# Patient Record
Sex: MALE | Race: WHITE | NOT HISPANIC OR LATINO
[De-identification: names, ages, dates, MRNs, and addresses within clinical notes are randomized per-mention and may not be internally consistent; named-entity substitution may affect disease eponyms.]

---

## 2017-03-01 PROBLEM — Z00.00 ENCOUNTER FOR PREVENTIVE HEALTH EXAMINATION: Status: ACTIVE | Noted: 2017-03-01

## 2017-03-21 ENCOUNTER — RECORD ABSTRACTING (OUTPATIENT)
Age: 77
End: 2017-03-21

## 2017-03-21 DIAGNOSIS — Z87.891 PERSONAL HISTORY OF NICOTINE DEPENDENCE: ICD-10-CM

## 2017-03-21 RX ORDER — ASPIRIN 325 MG/1
TABLET, FILM COATED ORAL
Refills: 0 | Status: ACTIVE | COMMUNITY

## 2017-03-30 ENCOUNTER — APPOINTMENT (OUTPATIENT)
Dept: VASCULAR SURGERY | Facility: CLINIC | Age: 77
End: 2017-03-30

## 2017-03-30 VITALS
SYSTOLIC BLOOD PRESSURE: 124 MMHG | DIASTOLIC BLOOD PRESSURE: 64 MMHG | WEIGHT: 214 LBS | BODY MASS INDEX: 26.61 KG/M2 | HEIGHT: 75 IN

## 2017-03-30 DIAGNOSIS — E78.00 PURE HYPERCHOLESTEROLEMIA, UNSPECIFIED: ICD-10-CM

## 2017-03-30 RX ORDER — ATORVASTATIN CALCIUM 40 MG/1
40 TABLET, FILM COATED ORAL
Refills: 0 | Status: ACTIVE | COMMUNITY

## 2017-09-28 ENCOUNTER — APPOINTMENT (OUTPATIENT)
Dept: VASCULAR SURGERY | Facility: CLINIC | Age: 77
End: 2017-09-28
Payer: MEDICARE

## 2017-09-28 VITALS
WEIGHT: 218 LBS | BODY MASS INDEX: 27.1 KG/M2 | DIASTOLIC BLOOD PRESSURE: 82 MMHG | SYSTOLIC BLOOD PRESSURE: 128 MMHG | HEIGHT: 75 IN

## 2017-09-28 PROCEDURE — 99213 OFFICE O/P EST LOW 20 MIN: CPT

## 2017-09-28 PROCEDURE — 93925 LOWER EXTREMITY STUDY: CPT

## 2018-04-12 ENCOUNTER — APPOINTMENT (OUTPATIENT)
Dept: VASCULAR SURGERY | Facility: CLINIC | Age: 78
End: 2018-04-12
Payer: MEDICARE

## 2018-04-12 VITALS
BODY MASS INDEX: 26.73 KG/M2 | DIASTOLIC BLOOD PRESSURE: 72 MMHG | SYSTOLIC BLOOD PRESSURE: 124 MMHG | WEIGHT: 215 LBS | HEIGHT: 75 IN

## 2018-04-12 PROCEDURE — 99212 OFFICE O/P EST SF 10 MIN: CPT

## 2018-09-20 ENCOUNTER — APPOINTMENT (OUTPATIENT)
Dept: VASCULAR SURGERY | Facility: CLINIC | Age: 78
End: 2018-09-20
Payer: MEDICARE

## 2018-09-20 VITALS
DIASTOLIC BLOOD PRESSURE: 70 MMHG | WEIGHT: 220 LBS | BODY MASS INDEX: 27.35 KG/M2 | SYSTOLIC BLOOD PRESSURE: 120 MMHG | HEIGHT: 75 IN

## 2018-09-20 PROCEDURE — 99214 OFFICE O/P EST MOD 30 MIN: CPT

## 2018-09-24 ENCOUNTER — FORM ENCOUNTER (OUTPATIENT)
Age: 78
End: 2018-09-24

## 2018-09-25 ENCOUNTER — OUTPATIENT (OUTPATIENT)
Dept: OUTPATIENT SERVICES | Facility: HOSPITAL | Age: 78
LOS: 1 days | Discharge: HOME | End: 2018-09-25

## 2018-09-25 VITALS
TEMPERATURE: 98 F | OXYGEN SATURATION: 97 % | RESPIRATION RATE: 18 BRPM | WEIGHT: 229.28 LBS | DIASTOLIC BLOOD PRESSURE: 79 MMHG | SYSTOLIC BLOOD PRESSURE: 169 MMHG | HEART RATE: 77 BPM | HEIGHT: 75 IN

## 2018-09-25 DIAGNOSIS — Z01.818 ENCOUNTER FOR OTHER PREPROCEDURAL EXAMINATION: ICD-10-CM

## 2018-09-25 DIAGNOSIS — I73.9 PERIPHERAL VASCULAR DISEASE, UNSPECIFIED: ICD-10-CM

## 2018-09-25 DIAGNOSIS — Z98.890 OTHER SPECIFIED POSTPROCEDURAL STATES: Chronic | ICD-10-CM

## 2018-09-25 LAB
ALBUMIN SERPL ELPH-MCNC: 4.6 G/DL — SIGNIFICANT CHANGE UP (ref 3.5–5.2)
ALP SERPL-CCNC: 85 U/L — SIGNIFICANT CHANGE UP (ref 30–115)
ALT FLD-CCNC: 21 U/L — SIGNIFICANT CHANGE UP (ref 0–41)
ANION GAP SERPL CALC-SCNC: 15 MMOL/L — HIGH (ref 7–14)
APTT BLD: 34.1 SEC — SIGNIFICANT CHANGE UP (ref 27–39.2)
AST SERPL-CCNC: 23 U/L — SIGNIFICANT CHANGE UP (ref 0–41)
BASOPHILS # BLD AUTO: 0.05 K/UL — SIGNIFICANT CHANGE UP (ref 0–0.2)
BASOPHILS NFR BLD AUTO: 0.5 % — SIGNIFICANT CHANGE UP (ref 0–1)
BILIRUB SERPL-MCNC: 0.4 MG/DL — SIGNIFICANT CHANGE UP (ref 0.2–1.2)
BUN SERPL-MCNC: 21 MG/DL — HIGH (ref 10–20)
CALCIUM SERPL-MCNC: 9.3 MG/DL — SIGNIFICANT CHANGE UP (ref 8.5–10.1)
CHLORIDE SERPL-SCNC: 104 MMOL/L — SIGNIFICANT CHANGE UP (ref 98–110)
CO2 SERPL-SCNC: 27 MMOL/L — SIGNIFICANT CHANGE UP (ref 17–32)
CREAT SERPL-MCNC: 0.9 MG/DL — SIGNIFICANT CHANGE UP (ref 0.7–1.5)
EOSINOPHIL # BLD AUTO: 0.19 K/UL — SIGNIFICANT CHANGE UP (ref 0–0.7)
EOSINOPHIL NFR BLD AUTO: 1.9 % — SIGNIFICANT CHANGE UP (ref 0–8)
GLUCOSE SERPL-MCNC: 93 MG/DL — SIGNIFICANT CHANGE UP (ref 70–99)
HCT VFR BLD CALC: 39.4 % — LOW (ref 42–52)
HGB BLD-MCNC: 13 G/DL — LOW (ref 14–18)
IMM GRANULOCYTES NFR BLD AUTO: 1.1 % — HIGH (ref 0.1–0.3)
INR BLD: 1.01 RATIO — SIGNIFICANT CHANGE UP (ref 0.65–1.3)
LYMPHOCYTES # BLD AUTO: 1.07 K/UL — LOW (ref 1.2–3.4)
LYMPHOCYTES # BLD AUTO: 10.6 % — LOW (ref 20.5–51.1)
MCHC RBC-ENTMCNC: 30.2 PG — SIGNIFICANT CHANGE UP (ref 27–31)
MCHC RBC-ENTMCNC: 33 G/DL — SIGNIFICANT CHANGE UP (ref 32–37)
MCV RBC AUTO: 91.4 FL — SIGNIFICANT CHANGE UP (ref 80–94)
MONOCYTES # BLD AUTO: 1.08 K/UL — HIGH (ref 0.1–0.6)
MONOCYTES NFR BLD AUTO: 10.7 % — HIGH (ref 1.7–9.3)
NEUTROPHILS # BLD AUTO: 7.55 K/UL — HIGH (ref 1.4–6.5)
NEUTROPHILS NFR BLD AUTO: 75.2 % — SIGNIFICANT CHANGE UP (ref 42.2–75.2)
NRBC # BLD: 0 /100 WBCS — SIGNIFICANT CHANGE UP (ref 0–0)
PLATELET # BLD AUTO: 275 K/UL — SIGNIFICANT CHANGE UP (ref 130–400)
POTASSIUM SERPL-MCNC: 4.6 MMOL/L — SIGNIFICANT CHANGE UP (ref 3.5–5)
POTASSIUM SERPL-SCNC: 4.6 MMOL/L — SIGNIFICANT CHANGE UP (ref 3.5–5)
PROT SERPL-MCNC: 6.6 G/DL — SIGNIFICANT CHANGE UP (ref 6–8)
PROTHROM AB SERPL-ACNC: 10.9 SEC — SIGNIFICANT CHANGE UP (ref 9.95–12.87)
RBC # BLD: 4.31 M/UL — LOW (ref 4.7–6.1)
RBC # FLD: 13.2 % — SIGNIFICANT CHANGE UP (ref 11.5–14.5)
SODIUM SERPL-SCNC: 146 MMOL/L — SIGNIFICANT CHANGE UP (ref 135–146)
WBC # BLD: 10.05 K/UL — SIGNIFICANT CHANGE UP (ref 4.8–10.8)
WBC # FLD AUTO: 10.05 K/UL — SIGNIFICANT CHANGE UP (ref 4.8–10.8)

## 2018-09-25 NOTE — H&P PST ADULT - REASON FOR ADMISSION
Pt denies cp palp uri cough dysuria or sob. ET 1 FOS denies SOB . scheduled for LLE angiogram possible endovascular revascularization. PT had s/p fall in house 7/2018 and left foot great toe was injured. pt had stitches placed and removed since  but slow healing noted per MD. PT SCHEDULED FOR  PROCEDURE 9/28/18. LEFT FOOT GREAT TOE SLIGHT SANGUINOUS DRAINAGE WITH WITH BAND AID IN PLACE L TOE.

## 2018-09-25 NOTE — H&P PST ADULT - PMH
HTN (hypertension)    Hypercholesteremia    Hypothyroidism    PVD (peripheral vascular disease)    Smoker  HX OF

## 2018-09-25 NOTE — H&P PST ADULT - ADDITIONAL PE
LEFT FOOT GREAT TOE SLIGHT SANGUINOUS DRAINAGE NOTED ON HEALING INNER LEFT TOE WOUND WITH SMALL BAND AID IN PLACE   BILATERAL FEET  W/P/D LEFT FOOT GREAT TOE SLIGHT SANGUINOUS DRAINAGE NOTED ON HEALING INNER LEFT TOE WOUND WITH SMALL BAND AID IN PLACE   BILATERAL FEET  W/P/D  DECREASED LUNG SOUNDS NOTED

## 2018-09-28 ENCOUNTER — OUTPATIENT (OUTPATIENT)
Dept: OUTPATIENT SERVICES | Facility: HOSPITAL | Age: 78
LOS: 1 days | Discharge: HOME | End: 2018-09-28

## 2018-09-28 ENCOUNTER — APPOINTMENT (OUTPATIENT)
Dept: VASCULAR SURGERY | Facility: HOSPITAL | Age: 78
End: 2018-09-28
Payer: MEDICARE

## 2018-09-28 VITALS
RESPIRATION RATE: 18 BRPM | HEIGHT: 75 IN | WEIGHT: 222.01 LBS | HEART RATE: 86 BPM | SYSTOLIC BLOOD PRESSURE: 162 MMHG | TEMPERATURE: 98 F | DIASTOLIC BLOOD PRESSURE: 89 MMHG

## 2018-09-28 VITALS
OXYGEN SATURATION: 97 % | RESPIRATION RATE: 18 BRPM | DIASTOLIC BLOOD PRESSURE: 71 MMHG | SYSTOLIC BLOOD PRESSURE: 123 MMHG | HEART RATE: 66 BPM

## 2018-09-28 DIAGNOSIS — Z98.890 OTHER SPECIFIED POSTPROCEDURAL STATES: Chronic | ICD-10-CM

## 2018-09-28 PROCEDURE — 75710 ARTERY X-RAYS ARM/LEG: CPT | Mod: 26

## 2018-09-28 PROCEDURE — 76937 US GUIDE VASCULAR ACCESS: CPT | Mod: 26

## 2018-09-28 PROCEDURE — 36246 INS CATH ABD/L-EXT ART 2ND: CPT | Mod: LT

## 2018-09-28 RX ORDER — HYDROMORPHONE HYDROCHLORIDE 2 MG/ML
0.5 INJECTION INTRAMUSCULAR; INTRAVENOUS; SUBCUTANEOUS
Qty: 0 | Refills: 0 | Status: DISCONTINUED | OUTPATIENT
Start: 2018-09-28 | End: 2018-09-28

## 2018-09-28 RX ORDER — SODIUM CHLORIDE 9 MG/ML
1000 INJECTION, SOLUTION INTRAVENOUS
Qty: 0 | Refills: 0 | Status: DISCONTINUED | OUTPATIENT
Start: 2018-09-28 | End: 2018-09-28

## 2018-09-28 RX ADMIN — HYDROMORPHONE HYDROCHLORIDE 0.5 MILLIGRAM(S): 2 INJECTION INTRAMUSCULAR; INTRAVENOUS; SUBCUTANEOUS at 14:29

## 2018-09-28 RX ADMIN — SODIUM CHLORIDE 75 MILLILITER(S): 9 INJECTION, SOLUTION INTRAVENOUS at 13:45

## 2018-09-28 RX ADMIN — HYDROMORPHONE HYDROCHLORIDE 0.5 MILLIGRAM(S): 2 INJECTION INTRAMUSCULAR; INTRAVENOUS; SUBCUTANEOUS at 14:05

## 2018-09-28 RX ADMIN — HYDROMORPHONE HYDROCHLORIDE 0.5 MILLIGRAM(S): 2 INJECTION INTRAMUSCULAR; INTRAVENOUS; SUBCUTANEOUS at 14:39

## 2018-09-28 NOTE — CHART NOTE - NSCHARTNOTEFT_GEN_A_CORE
PACU ANESTHESIA ADMISSION NOTE      Procedure: Angiogram, extremity, left    Post op diagnosis:  Atherosclerosis of left lower extremity with ulceration of other part of foot      ____  Intubated  TV:______       Rate: ______      FiO2: ______    __x__  Patent Airway    __x__  Full return of protective reflexes    __x__  Full recovery from anesthesia / back to baseline     Vitals:   T:      98     R:         14         BP:   93/63               Sat:          100         P: 73      Mental Status:  __x__ Awake   _____ Alert   ___x__ Drowsy   _____ Sedated    Nausea/Vomiting:  __x__ NO  ______Yes,   See Post - Op Orders          Pain Scale (0-10):  _____    Treatment: ____ None    __x__ See Post - Op/PCA Orders    Post - Operative Fluids:   ____ Oral   ___x_ See Post - Op Orders    Plan: Discharge:   __x__Home       _____Floor     _____Critical Care    _____  Other:_________________    Comments: Discharge home when meets criteria

## 2018-09-28 NOTE — BRIEF OPERATIVE NOTE - PROCEDURE
<<-----Click on this checkbox to enter Procedure Angiogram, extremity, left  09/28/2018    Active  JSCHOR

## 2018-09-28 NOTE — BRIEF OPERATIVE NOTE - PRE-OP DX
Atherosclerosis of native artery of left lower extremity with ulceration of other part of lower leg  09/28/2018    Active  Anders Tavarez

## 2018-09-28 NOTE — BRIEF OPERATIVE NOTE - POST-OP DX
Atherosclerosis of left lower extremity with ulceration of other part of foot  09/28/2018    Active  Anders Tavarez

## 2018-09-28 NOTE — ASU DISCHARGE PLAN (ADULT/PEDIATRIC). - NOTIFY
Bleeding that does not stop/Numbness, color, or temperature change to extremity/Swelling that continues/Persistent Nausea and Vomiting/Fever greater than 101/Pain not relieved by Medications

## 2018-09-28 NOTE — ASU PATIENT PROFILE, ADULT - ABILITY TO HEAR (WITH HEARING AID OR HEARING APPLIANCE IF NORMALLY USED):
hearing aid rt ear/Mildly to Moderately Impaired: difficulty hearing in some environments or speaker may need to increase volume or speak distinctly

## 2018-10-01 ENCOUNTER — APPOINTMENT (OUTPATIENT)
Dept: VASCULAR SURGERY | Facility: CLINIC | Age: 78
End: 2018-10-01
Payer: MEDICARE

## 2018-10-01 DIAGNOSIS — Z01.810 ENCOUNTER FOR PREPROCEDURAL CARDIOVASCULAR EXAMINATION: ICD-10-CM

## 2018-10-01 DIAGNOSIS — Z01.818 ENCOUNTER FOR OTHER PREPROCEDURAL EXAMINATION: ICD-10-CM

## 2018-10-01 PROBLEM — E78.00 PURE HYPERCHOLESTEROLEMIA, UNSPECIFIED: Chronic | Status: ACTIVE | Noted: 2018-09-25

## 2018-10-01 PROBLEM — I10 ESSENTIAL (PRIMARY) HYPERTENSION: Chronic | Status: ACTIVE | Noted: 2018-09-25

## 2018-10-01 PROBLEM — F17.200 NICOTINE DEPENDENCE, UNSPECIFIED, UNCOMPLICATED: Chronic | Status: ACTIVE | Noted: 2018-09-25

## 2018-10-01 PROBLEM — E03.9 HYPOTHYROIDISM, UNSPECIFIED: Chronic | Status: ACTIVE | Noted: 2018-09-25

## 2018-10-01 PROBLEM — I73.9 PERIPHERAL VASCULAR DISEASE, UNSPECIFIED: Chronic | Status: ACTIVE | Noted: 2018-09-25

## 2018-10-01 PROCEDURE — G0365: CPT

## 2018-10-01 PROCEDURE — 93970 EXTREMITY STUDY: CPT

## 2018-10-01 PROCEDURE — 99213 OFFICE O/P EST LOW 20 MIN: CPT

## 2018-10-03 DIAGNOSIS — F17.210 NICOTINE DEPENDENCE, CIGARETTES, UNCOMPLICATED: ICD-10-CM

## 2018-10-03 DIAGNOSIS — I70.244 ATHEROSCLEROSIS OF NATIVE ARTERIES OF LEFT LEG WITH ULCERATION OF HEEL AND MIDFOOT: ICD-10-CM

## 2018-10-03 DIAGNOSIS — E78.00 PURE HYPERCHOLESTEROLEMIA, UNSPECIFIED: ICD-10-CM

## 2018-10-03 DIAGNOSIS — I10 ESSENTIAL (PRIMARY) HYPERTENSION: ICD-10-CM

## 2018-10-03 DIAGNOSIS — L97.529 NON-PRESSURE CHRONIC ULCER OF OTHER PART OF LEFT FOOT WITH UNSPECIFIED SEVERITY: ICD-10-CM

## 2018-10-16 ENCOUNTER — RESULT REVIEW (OUTPATIENT)
Age: 78
End: 2018-10-16

## 2018-10-16 ENCOUNTER — APPOINTMENT (OUTPATIENT)
Dept: VASCULAR SURGERY | Facility: HOSPITAL | Age: 78
End: 2018-10-16
Payer: MEDICARE

## 2018-10-16 ENCOUNTER — INPATIENT (INPATIENT)
Facility: HOSPITAL | Age: 78
LOS: 3 days | Discharge: HOME HEALTH | End: 2018-10-20
Attending: SURGERY | Admitting: SURGERY
Payer: MEDICARE

## 2018-10-16 VITALS
TEMPERATURE: 98 F | HEIGHT: 75 IN | HEART RATE: 98 BPM | RESPIRATION RATE: 18 BRPM | SYSTOLIC BLOOD PRESSURE: 159 MMHG | WEIGHT: 225.97 LBS | DIASTOLIC BLOOD PRESSURE: 84 MMHG

## 2018-10-16 DIAGNOSIS — Z98.890 OTHER SPECIFIED POSTPROCEDURAL STATES: Chronic | ICD-10-CM

## 2018-10-16 LAB
ABO RH CONFIRMATION: SIGNIFICANT CHANGE UP
ANION GAP SERPL CALC-SCNC: 15 MMOL/L — HIGH (ref 7–14)
APTT BLD: 29.4 SEC — SIGNIFICANT CHANGE UP (ref 27–39.2)
BASOPHILS # BLD AUTO: 0.05 K/UL — SIGNIFICANT CHANGE UP (ref 0–0.2)
BASOPHILS NFR BLD AUTO: 0.3 % — SIGNIFICANT CHANGE UP (ref 0–1)
BLD GP AB SCN SERPL QL: SIGNIFICANT CHANGE UP
BUN SERPL-MCNC: 15 MG/DL — SIGNIFICANT CHANGE UP (ref 10–20)
CALCIUM SERPL-MCNC: 7.9 MG/DL — LOW (ref 8.5–10.1)
CHLORIDE SERPL-SCNC: 101 MMOL/L — SIGNIFICANT CHANGE UP (ref 98–110)
CO2 SERPL-SCNC: 22 MMOL/L — SIGNIFICANT CHANGE UP (ref 17–32)
CREAT SERPL-MCNC: 0.9 MG/DL — SIGNIFICANT CHANGE UP (ref 0.7–1.5)
EOSINOPHIL # BLD AUTO: 0.01 K/UL — SIGNIFICANT CHANGE UP (ref 0–0.7)
EOSINOPHIL NFR BLD AUTO: 0.1 % — SIGNIFICANT CHANGE UP (ref 0–8)
GLUCOSE SERPL-MCNC: 121 MG/DL — HIGH (ref 70–99)
HCT VFR BLD CALC: 35.3 % — LOW (ref 42–52)
HGB BLD-MCNC: 11.6 G/DL — LOW (ref 14–18)
IMM GRANULOCYTES NFR BLD AUTO: 0.5 % — HIGH (ref 0.1–0.3)
INR BLD: 1.04 RATIO — SIGNIFICANT CHANGE UP (ref 0.65–1.3)
LYMPHOCYTES # BLD AUTO: 0.67 K/UL — LOW (ref 1.2–3.4)
LYMPHOCYTES # BLD AUTO: 3.6 % — LOW (ref 20.5–51.1)
MAGNESIUM SERPL-MCNC: 1.8 MG/DL — SIGNIFICANT CHANGE UP (ref 1.8–2.4)
MCHC RBC-ENTMCNC: 30.4 PG — SIGNIFICANT CHANGE UP (ref 27–31)
MCHC RBC-ENTMCNC: 32.9 G/DL — SIGNIFICANT CHANGE UP (ref 32–37)
MCV RBC AUTO: 92.4 FL — SIGNIFICANT CHANGE UP (ref 80–94)
MONOCYTES # BLD AUTO: 1.14 K/UL — HIGH (ref 0.1–0.6)
MONOCYTES NFR BLD AUTO: 6.1 % — SIGNIFICANT CHANGE UP (ref 1.7–9.3)
NEUTROPHILS # BLD AUTO: 16.81 K/UL — HIGH (ref 1.4–6.5)
NEUTROPHILS NFR BLD AUTO: 89.4 % — HIGH (ref 42.2–75.2)
PLATELET # BLD AUTO: 272 K/UL — SIGNIFICANT CHANGE UP (ref 130–400)
POTASSIUM SERPL-MCNC: 4.4 MMOL/L — SIGNIFICANT CHANGE UP (ref 3.5–5)
POTASSIUM SERPL-SCNC: 4.4 MMOL/L — SIGNIFICANT CHANGE UP (ref 3.5–5)
PROTHROM AB SERPL-ACNC: 12 SEC — SIGNIFICANT CHANGE UP (ref 9.95–12.87)
RBC # BLD: 3.82 M/UL — LOW (ref 4.7–6.1)
RBC # FLD: 13.5 % — SIGNIFICANT CHANGE UP (ref 11.5–14.5)
SODIUM SERPL-SCNC: 138 MMOL/L — SIGNIFICANT CHANGE UP (ref 135–146)
TYPE + AB SCN PNL BLD: SIGNIFICANT CHANGE UP
WBC # BLD: 18.78 K/UL — HIGH (ref 4.8–10.8)
WBC # FLD AUTO: 18.78 K/UL — HIGH (ref 4.8–10.8)

## 2018-10-16 PROCEDURE — 35566 ART BYP FEM-ANT-POST TIB/PRL: CPT | Mod: LT

## 2018-10-16 PROCEDURE — 35371 RECHANNELING OF ARTERY: CPT | Mod: LT

## 2018-10-16 RX ORDER — ATORVASTATIN CALCIUM 80 MG/1
40 TABLET, FILM COATED ORAL DAILY
Qty: 0 | Refills: 0 | Status: DISCONTINUED | OUTPATIENT
Start: 2018-10-16 | End: 2018-10-17

## 2018-10-16 RX ORDER — ASPIRIN/CALCIUM CARB/MAGNESIUM 324 MG
325 TABLET ORAL DAILY
Qty: 0 | Refills: 0 | Status: DISCONTINUED | OUTPATIENT
Start: 2018-10-16 | End: 2018-10-17

## 2018-10-16 RX ORDER — OXYCODONE HYDROCHLORIDE 5 MG/1
10 TABLET ORAL EVERY 4 HOURS
Qty: 0 | Refills: 0 | Status: DISCONTINUED | OUTPATIENT
Start: 2018-10-16 | End: 2018-10-20

## 2018-10-16 RX ORDER — DOCUSATE SODIUM 100 MG
100 CAPSULE ORAL EVERY 8 HOURS
Qty: 0 | Refills: 0 | Status: DISCONTINUED | OUTPATIENT
Start: 2018-10-16 | End: 2018-10-20

## 2018-10-16 RX ORDER — HEPARIN SODIUM 5000 [USP'U]/ML
500 INJECTION INTRAVENOUS; SUBCUTANEOUS
Qty: 25000 | Refills: 0 | Status: DISCONTINUED | OUTPATIENT
Start: 2018-10-16 | End: 2018-10-17

## 2018-10-16 RX ORDER — HYDROMORPHONE HYDROCHLORIDE 2 MG/ML
0.5 INJECTION INTRAMUSCULAR; INTRAVENOUS; SUBCUTANEOUS
Qty: 0 | Refills: 0 | Status: DISCONTINUED | OUTPATIENT
Start: 2018-10-16 | End: 2018-10-17

## 2018-10-16 RX ORDER — CEFAZOLIN SODIUM 1 G
1000 VIAL (EA) INJECTION EVERY 8 HOURS
Qty: 0 | Refills: 0 | Status: COMPLETED | OUTPATIENT
Start: 2018-10-16 | End: 2018-10-17

## 2018-10-16 RX ORDER — OXYCODONE HYDROCHLORIDE 5 MG/1
5 TABLET ORAL ONCE
Qty: 0 | Refills: 0 | Status: DISCONTINUED | OUTPATIENT
Start: 2018-10-16 | End: 2018-10-17

## 2018-10-16 RX ORDER — SODIUM CHLORIDE 9 MG/ML
1000 INJECTION, SOLUTION INTRAVENOUS
Qty: 0 | Refills: 0 | Status: DISCONTINUED | OUTPATIENT
Start: 2018-10-16 | End: 2018-10-19

## 2018-10-16 RX ORDER — MEPERIDINE HYDROCHLORIDE 50 MG/ML
12.5 INJECTION INTRAMUSCULAR; INTRAVENOUS; SUBCUTANEOUS
Qty: 0 | Refills: 0 | Status: DISCONTINUED | OUTPATIENT
Start: 2018-10-16 | End: 2018-10-17

## 2018-10-16 RX ORDER — OXYCODONE HYDROCHLORIDE 5 MG/1
10 TABLET ORAL ONCE
Qty: 0 | Refills: 0 | Status: DISCONTINUED | OUTPATIENT
Start: 2018-10-16 | End: 2018-10-17

## 2018-10-16 RX ORDER — OXYCODONE HYDROCHLORIDE 5 MG/1
5 TABLET ORAL EVERY 4 HOURS
Qty: 0 | Refills: 0 | Status: DISCONTINUED | OUTPATIENT
Start: 2018-10-16 | End: 2018-10-20

## 2018-10-16 RX ORDER — HYDROMORPHONE HYDROCHLORIDE 2 MG/ML
1 INJECTION INTRAMUSCULAR; INTRAVENOUS; SUBCUTANEOUS
Qty: 0 | Refills: 0 | Status: DISCONTINUED | OUTPATIENT
Start: 2018-10-16 | End: 2018-10-17

## 2018-10-16 RX ORDER — SODIUM CHLORIDE 9 MG/ML
1000 INJECTION, SOLUTION INTRAVENOUS
Qty: 0 | Refills: 0 | Status: DISCONTINUED | OUTPATIENT
Start: 2018-10-16 | End: 2018-10-17

## 2018-10-16 RX ORDER — ONDANSETRON 8 MG/1
4 TABLET, FILM COATED ORAL ONCE
Qty: 0 | Refills: 0 | Status: DISCONTINUED | OUTPATIENT
Start: 2018-10-16 | End: 2018-10-17

## 2018-10-16 RX ORDER — ACETAMINOPHEN 500 MG
650 TABLET ORAL EVERY 4 HOURS
Qty: 0 | Refills: 0 | Status: DISCONTINUED | OUTPATIENT
Start: 2018-10-16 | End: 2018-10-20

## 2018-10-16 RX ORDER — CHLORHEXIDINE GLUCONATE 213 G/1000ML
1 SOLUTION TOPICAL
Qty: 0 | Refills: 0 | Status: DISCONTINUED | OUTPATIENT
Start: 2018-10-16 | End: 2018-10-20

## 2018-10-16 RX ORDER — LEVOTHYROXINE SODIUM 125 MCG
75 TABLET ORAL DAILY
Qty: 0 | Refills: 0 | Status: DISCONTINUED | OUTPATIENT
Start: 2018-10-16 | End: 2018-10-20

## 2018-10-16 RX ORDER — MORPHINE SULFATE 50 MG/1
2 CAPSULE, EXTENDED RELEASE ORAL EVERY 4 HOURS
Qty: 0 | Refills: 0 | Status: DISCONTINUED | OUTPATIENT
Start: 2018-10-16 | End: 2018-10-20

## 2018-10-16 RX ADMIN — SODIUM CHLORIDE 100 MILLILITER(S): 9 INJECTION, SOLUTION INTRAVENOUS at 23:06

## 2018-10-16 RX ADMIN — Medication 100 MILLIGRAM(S): at 22:55

## 2018-10-16 RX ADMIN — ATORVASTATIN CALCIUM 40 MILLIGRAM(S): 80 TABLET, FILM COATED ORAL at 22:52

## 2018-10-16 RX ADMIN — HEPARIN SODIUM 5 UNIT(S)/HR: 5000 INJECTION INTRAVENOUS; SUBCUTANEOUS at 22:48

## 2018-10-16 RX ADMIN — SODIUM CHLORIDE 125 MILLILITER(S): 9 INJECTION, SOLUTION INTRAVENOUS at 22:41

## 2018-10-16 RX ADMIN — Medication 100 MILLIGRAM(S): at 22:51

## 2018-10-16 NOTE — CONSULT NOTE ADULT - ASSESSMENT
ASSESSMENT:  77y Male with severe CFA disease, SFA occlusion, S/P uncomplicated L femoral artery endarterectomy, Left femoral to peroneal artery bypass with RSVG.    PLAN:   NEURO: No dx, Monitor for changes in mental status, Pain regimen: Tylenol, Oxycodone, Morphine  PULM: Previous smoker extubated at end of case, monitor for respiratory distress, pulse oxymetry, Incentive spirometer, O2NC PRN, maintain Sat >90%  CARDIO: HTN/HCL, Monitor for HD instability, maintain normotensive, MAP >65mmHg, Continue on Home meds: atorvastatin,   GI: No dx, regular diet, IVL, GI ppx with PPI, Bowel regimen: Colace  RENAL/: No dx, Mcneal in place, monitor UOP: _____ , Strict I+O, correct electrolytes  HEME: No dx, Monitor H/H: _____ , DVT ppx: Hep gtt @500/h do not titrate  ID: No dx, Monitor for fevers, leukocytosis, WBC: _____  VASCULAR: Pulse exam: Palpable (P) Dopplerable (D)  RUE: Radial P  LUE: Radial P  RLE: DP x PT D  LLE: DP x PT D  Vascualr checks: q1h  Lines/Tubes: PIV, mcneal  Endocrine: Hypothyroid, c/w synthroid home med  Disposition: SICU for q1h NCs    --------------------------------------------------------------------------------------    Critical Care Diagnoses: S/P femoral to peroneal artery bypass upgraded for q1h vascualr checks, HD monitoring ASSESSMENT:  77y Male with severe CFA disease, SFA occlusion, S/P uncomplicated L femoral artery endarterectomy, Left femoral to peroneal artery bypass with RSVG.    PLAN:   NEURO: No dx, Monitor for changes in mental status, Pain regimen: Tylenol, Oxycodone, Morphine  PULM: Previous smoker extubated at end of case, monitor for respiratory distress, pulse oxymetry, Incentive spirometer, O2NC PRN, maintain Sat >90%  CARDIO: HTN/HCL, Monitor for HD instability, maintain normotensive, MAP >65mmHg, Continue on Home meds: atorvastatin,   GI: No dx, regular diet, IVL, GI ppx with PPI, Bowel regimen: Colace  RENAL/: No dx, Mcneal in place, monitor UOP: _____ , Strict I+O, correct electrolytes  HEME: No dx, Monitor H/H: _____ , DVT ppx: Hep gtt @500/h do not titrate  ID: Connie-op Ancef, Monitor for fevers, leukocytosis, WBC: _____  VASCULAR: Pulse exam: Palpable (P) Dopplerable (D)  RUE: Radial P  LUE: Radial P  RLE: DP x PT D  LLE: DP x PT D  Vascualr checks: q1h  Lines/Tubes: PIV, mcneal  Endocrine: Hypothyroid, c/w synthroid home med  Disposition: SICU for q1h NCs    --------------------------------------------------------------------------------------    Critical Care Diagnoses: S/P femoral to peroneal artery bypass upgraded for q1h vascualr checks, HD monitoring

## 2018-10-16 NOTE — H&P PST ADULT - REASON FOR ADMISSION
Pt denies cp palp uri cough dysuria or sob. ET 1 FOS denies SOB . scheduled for LLE angiogram possible endovascular revascularization. PT had s/p fall in house 7/2018 and left foot great toe was injured. pt had stitches placed and removed since  but slow healing noted per MD. C/O GREAT TOE SLIGHT SANGUINOUS DRAINAGE WITH WITH BAND AID IN PLACE L TOE.  Pt underwent a diagnostic angiogram in September 2018 showing severe CFA disease and SFA occlusion, and now returns for recommeded for femoral to peroneal artery bypass

## 2018-10-16 NOTE — H&P PST ADULT - ASSESSMENT
Pt denies cp palp uri cough dysuria or sob. ET 1 FOS denies SOB . scheduled for LLE angiogram possible endovascular revascularization. PT had s/p fall in house 7/2018 and left foot great toe was injured. pt had stitches placed and removed since  but slow healing noted per MD. C/O GREAT TOE SLIGHT SANGUINOUS DRAINAGE WITH WITH BAND AID IN PLACE L TOE.  Pt underwent a diagnostic angiogram in September 2018 showing severe CFA disease and SFA occlusion, and now returns for recommended for femoral to peroneal artery bypass

## 2018-10-16 NOTE — H&P PST ADULT - ADDITIONAL PE
LEFT FOOT GREAT TOE SLIGHT SANGUINOUS DRAINAGE NOTED ON HEALING INNER LEFT TOE WOUND WITH SMALL BAND AID IN PLACE   BILATERAL FEET  W/P/D  DECREASED LUNG SOUNDS NOTED

## 2018-10-16 NOTE — CONSULT NOTE ADULT - SUBJECTIVE AND OBJECTIVE BOX
SICU Consultation Note  =====================================================  HPI:   77y Male PMH HTN, HCL, Hypothyroidism, ex-smoker, PAD, non-healing ulcer of L great toe, s/p LLE angio on 9/28/18 with severe CFA disease, SFA occlusion, and PT run off, not amenable to endovascular intervention, presents for bypass. Patient underwent uncomplicated L femoral artery endarterectomy, Left femoral to peroneal artery bypass with RSVG. Case was 7 hours EBL 400mL, HD stable throughout, extubated at end of case. Patient now with dopplerable PT bilaterally no DP signals. SICU consulted for q1h vascular checks post operatively.    Surgery Information  OR time: 7h 22min     EBL: 400         IV Fluids: 2600      Blood Products: none, HD stable throughout  UOP: 500 (mcneal)       PAST MEDICAL & SURGICAL HISTORY:  PVD (peripheral vascular disease)  Smoker: HX OF  Hypercholesteremia  Hypothyroidism  HTN (hypertension)  History of surgery: EGD    Home Meds: Home Medications:  aspirin 325 mg oral tablet: 1 tab(s) orally once a day (16 Oct 2018 09:42)  atorvastatin 40 mg oral tablet: 1 tab(s) orally once a day (16 Oct 2018 09:42)  levothyroxine 75 mcg (0.075 mg) oral tablet: 1 tab(s) orally once a day (16 Oct 2018 09:42)  Multiple Vitamins oral capsule: 1 cap(s) orally once a day (16 Oct 2018 09:42)    Allergies: Allergies  No Known Allergies    Soc:   Previous smoker  Advanced Directives: Presumed Full Code     ROS:    REVIEW OF SYSTEMS  [x] A ten-point review of systems was otherwise negative except as noted.    CURRENT MEDICATIONS:   --------------------------------------------------------------------------------------  Neurologic Medications  acetaminophen   Tablet .. 650 milliGRAM(s) Oral every 4 hours PRN Mild Pain (1 - 3)  aspirin 325 milliGRAM(s) Oral daily  HYDROmorphone  Injectable 0.5 milliGRAM(s) IV Push every 10 minutes PRN Moderate Pain (4 - 6)  HYDROmorphone  Injectable 1 milliGRAM(s) IV Push every 10 minutes PRN Severe Pain (7 - 10)  meperidine     Injectable 12.5 milliGRAM(s) IV Push every 10 minutes PRN Shivering  morphine  - Injectable 2 milliGRAM(s) IV Push every 4 hours PRN severe breakthrough pain  ondansetron Injectable 4 milliGRAM(s) IV Push once PRN Nausea and/or Vomiting  oxyCODONE    IR 5 milliGRAM(s) Oral once PRN Moderate Pain (4 - 6)  oxyCODONE    IR 10 milliGRAM(s) Oral once PRN Severe Pain (7 - 10)  oxyCODONE    IR 5 milliGRAM(s) Oral every 4 hours PRN Moderate Pain (4 - 6)  oxyCODONE    IR 10 milliGRAM(s) Oral every 4 hours PRN Severe Pain (7 - 10)    Gastrointestinal Medications  docusate sodium 100 milliGRAM(s) Oral every 8 hours  lactated ringers. 1000 milliLiter(s) IV Continuous <Continuous>  multivitamin 1 Tablet(s) Oral daily    Hematologic/Oncologic Medications  heparin  Infusion 500 Unit(s)/Hr IV Continuous <Continuous>    Antimicrobial/Immunologic Medications  ceFAZolin   IVPB 1000 milliGRAM(s) IV Intermittent every 8 hours    Endocrine/Metabolic Medications  atorvastatin Oral Tab/Cap - Peds 40 milliGRAM(s) Oral daily  levothyroxine 75 MICROGram(s) Oral daily    Topical/Other Medications  chlorhexidine 4% Liquid 1 Application(s) Topical <User Schedule>    --------------------------------------------------------------------------------------  VITAL SIGNS, INS/OUTS (last 24 hours):  --------------------------------------------------------------------------------------  ICU Vital Signs Last 24 Hrs  T(C): 36.8 (16 Oct 2018 21:24), Max: 36.8 (16 Oct 2018 21:24)  T(F): 98.2 (16 Oct 2018 21:24), Max: 98.2 (16 Oct 2018 21:24)  HR: 90 (16 Oct 2018 21:46) (87 - 98)  BP: 130/71 (16 Oct 2018 21:46) (120/65 - 159/84)  BP(mean): 90 (16 Oct 2018 21:46) (88 - 100)  RR: 17 (16 Oct 2018 21:46) (15 - 21)  SpO2: 96% (16 Oct 2018 21:46) (96% - 98%)  --------------------------------------------------------------------------------------    EXAM:  General/Neuro  Exam: NAD, alert, oriented x 3, no focal deficits. PERRLA     Respiratory  Exam: Lungs clear to auscultation, Normal expansion/effort.     Cardiovascular  Exam: S1, S2.  Regular rate and rhythm.  No peripheral edema  Cardiac Rhythm: Normal Sinus Rhythm    GI  Exam: Abdomen soft, Non-tender, Non-distended.  +BS    Tubes/Lines/Drains   [x] Peripheral IV  [x] Urinary Catheter		Date Placed: 10/16    Extremities  Exam: Extremities warm, pink, well-perfused.  Syed hugger in place. L great toe ulcer. Medial thigh and medial lower leg incision with dressings in place, C/D/I    Vascular: Doppler signals in BL PT no DP signal    Derm:  Exam: Good skin turgor, no skin breakdown.  No mottling/color changes noted    :   Exam: Mcneal catheter in place. Making good yellow urine    LABS  --------------------------------------------------------------------------------------  CAPILLARY BLOOD GLUCOSE  Pending...    --------------------------------------------------------------------------------------  IMAGING RESULTS  < from: Xray Chest 2 Views PA/Lat (09.25.18 @ 07:40) >  Impression:    Bibasilar focal atelectasis. Hiatal hernia.  < end of copied text >

## 2018-10-16 NOTE — BRIEF OPERATIVE NOTE - PROCEDURE
<<-----Click on this checkbox to enter Procedure Endarterectomy of femoral artery using patch graft for repair  10/16/2018    Active  JSCHOR  Femoral tibial bypass with vein  10/16/2018  Left femoral to peroneal with RSVG  Active  JSCHOR

## 2018-10-16 NOTE — CHART NOTE - NSCHARTNOTEFT_GEN_A_CORE
PACU ANESTHESIA ADMISSION NOTE      Procedure: Femoral tibial bypass with vein: Left femoral to peroneal with RSVG  Endarterectomy of femoral artery using patch graft for repair    Post op diagnosis:  Atherosclerosis of native artery of left lower extremity with ulceration of other part of foot      ____  Intubated  TV:______       Rate: ______      FiO2: ______    _x___  Patent Airway    _x___  Full return of protective reflexes    _x___  Full recovery from anesthesia / back to baseline status    Vitals:            T:  98.2              BP :    120/65            R: 14             Sat: 97%              P: 93      Mental Status:  _x___ Awake   _____ Alert   _____ Drowsy   _____ Sedated    Nausea/Vomiting:  _x___  NO       ______Yes,   See Post - Op Orders         Pain Scale (0-10):  __0___    Treatment: ____ None    __x__ See Post - Op/PCA Orders    Post - Operative Fluids:   ____ Oral   __x__ See Post - Op Orders    Plan: Discharge:   ___Home       ___x__Floor     _____Critical Care    _____  Other:_________________    Comments:  No anesthesia issues or complications noted.  Discharge when criteria met.

## 2018-10-16 NOTE — BRIEF OPERATIVE NOTE - PRE-OP DX
Atherosclerosis of native artery of left lower extremity with ulceration of other part of foot  10/16/2018    Active  Anders Tavarez

## 2018-10-16 NOTE — PROGRESS NOTE ADULT - SUBJECTIVE AND OBJECTIVE BOX
Procedure: Status post left femoral to peronal  bypass with vein w/ RSVG  Post Operative day #0    Patient resting comfortably no complaints     pmh:  PVD (peripheral vascular disease)  Smoker  Hypercholesteremia  Hypothyroidism  HTN (hypertension)  Atherosclerosis of native artery of left lower extremity with ulceration of other part of foot  Atherosclerosis of native artery of left lower extremity with ulceration of other part of foot  Femoral tibial bypass with vein  Endarterectomy of femoral artery using patch graft for repair  History of surgery    No Known Allergies    acetaminophen   Tablet .. 650 milliGRAM(s) Oral every 4 hours PRN  aspirin 325 milliGRAM(s) Oral daily  atorvastatin Oral Tab/Cap - Peds 40 milliGRAM(s) Oral daily  ceFAZolin   IVPB 1000 milliGRAM(s) IV Intermittent every 8 hours  chlorhexidine 4% Liquid 1 Application(s) Topical <User Schedule>  docusate sodium 100 milliGRAM(s) Oral every 8 hours  heparin  Infusion 500 Unit(s)/Hr IV Continuous <Continuous>  HYDROmorphone  Injectable 0.5 milliGRAM(s) IV Push every 10 minutes PRN  HYDROmorphone  Injectable 1 milliGRAM(s) IV Push every 10 minutes PRN  lactated ringers. 1000 milliLiter(s) IV Continuous <Continuous>  lactated ringers. 1000 milliLiter(s) IV Continuous <Continuous>  levothyroxine 75 MICROGram(s) Oral daily  meperidine     Injectable 12.5 milliGRAM(s) IV Push every 10 minutes PRN  morphine  - Injectable 2 milliGRAM(s) IV Push every 4 hours PRN  multivitamin 1 Tablet(s) Oral daily  ondansetron Injectable 4 milliGRAM(s) IV Push once PRN  oxyCODONE    IR 5 milliGRAM(s) Oral once PRN  oxyCODONE    IR 10 milliGRAM(s) Oral once PRN  oxyCODONE    IR 5 milliGRAM(s) Oral every 4 hours PRN  oxyCODONE    IR 10 milliGRAM(s) Oral every 4 hours PRN          T(C): 36.8 (10-16-18 @ 23:01), Max: 36.8 (10-16-18 @ 21:24)  HR: 97 (10-16-18 @ 23:01) (87 - 98)  BP: 132/60 (10-16-18 @ 23:01) (113/63 - 159/84)  RR: 16 (10-16-18 @ 23:01) (15 - 28)  SpO2: 95% (10-16-18 @ 23:01) (95% - 98%)    10-16-18 @ 07:01  -  10-16-18 @ 23:36  --------------------------------------------------------  IN: 233 mL / OUT: 75 mL / NET: 158 mL        General:Alert and oriented times 3, not in acute distress   Heart: Regular rate and rhythm, no rubs , murmurs or gallops  Lungs: Clear to auscultation bilaterally, no wheezes, rales, rhonci appreciated  Abdomen: Soft , positive bowel sounds, no tenderness, no distention, no peritoneal signs   Exremites:LEFT Groin- to below the knee incision clean dry and intact,  warm extremities,  good color, no swelling, motor and sensation , pulses dp bilateral dopplerable               11.6   18.78 )-----------( 272      ( 10-16 @ 22:30 )             35.3                    138   |  101   |  15                 Ca: 7.9    BMP:   ----------------------------< 121    M.8   (10-16-18 @ 22:30)             4.4    |  22    | 0.9                Ph: x                PT/INR - ( 16 Oct 2018 22:30 )   PT: 12.00 sec;   INR: 1.04 ratio         PTT - ( 16 Oct 2018 22:30 )  PTT:29.4 sec

## 2018-10-17 LAB
ANION GAP SERPL CALC-SCNC: 13 MMOL/L — SIGNIFICANT CHANGE UP (ref 7–14)
APTT BLD: 29.8 SEC — SIGNIFICANT CHANGE UP (ref 27–39.2)
APTT BLD: 62.1 SEC — HIGH (ref 27–39.2)
BASOPHILS # BLD AUTO: 0.03 K/UL — SIGNIFICANT CHANGE UP (ref 0–0.2)
BASOPHILS NFR BLD AUTO: 0.2 % — SIGNIFICANT CHANGE UP (ref 0–1)
BUN SERPL-MCNC: 13 MG/DL — SIGNIFICANT CHANGE UP (ref 10–20)
CALCIUM SERPL-MCNC: 8 MG/DL — LOW (ref 8.5–10.1)
CHLORIDE SERPL-SCNC: 103 MMOL/L — SIGNIFICANT CHANGE UP (ref 98–110)
CK MB CFR SERPL CALC: 6.3 NG/ML — SIGNIFICANT CHANGE UP (ref 0.6–6.3)
CK MB CFR SERPL CALC: 6.3 NG/ML — SIGNIFICANT CHANGE UP (ref 0.6–6.3)
CK SERPL-CCNC: 598 U/L — HIGH (ref 0–225)
CK SERPL-CCNC: 685 U/L — HIGH (ref 0–225)
CO2 SERPL-SCNC: 24 MMOL/L — SIGNIFICANT CHANGE UP (ref 17–32)
CREAT SERPL-MCNC: 0.9 MG/DL — SIGNIFICANT CHANGE UP (ref 0.7–1.5)
EOSINOPHIL # BLD AUTO: 0.01 K/UL — SIGNIFICANT CHANGE UP (ref 0–0.7)
EOSINOPHIL NFR BLD AUTO: 0.1 % — SIGNIFICANT CHANGE UP (ref 0–8)
GLUCOSE SERPL-MCNC: 109 MG/DL — HIGH (ref 70–99)
HCT VFR BLD CALC: 33.8 % — LOW (ref 42–52)
HCT VFR BLD CALC: 33.9 % — LOW (ref 42–52)
HGB BLD-MCNC: 11 G/DL — LOW (ref 14–18)
HGB BLD-MCNC: 11 G/DL — LOW (ref 14–18)
IMM GRANULOCYTES NFR BLD AUTO: 0.5 % — HIGH (ref 0.1–0.3)
INR BLD: 1.11 RATIO — SIGNIFICANT CHANGE UP (ref 0.65–1.3)
INR BLD: 1.11 RATIO — SIGNIFICANT CHANGE UP (ref 0.65–1.3)
LYMPHOCYTES # BLD AUTO: 0.72 K/UL — LOW (ref 1.2–3.4)
LYMPHOCYTES # BLD AUTO: 5 % — LOW (ref 20.5–51.1)
MAGNESIUM SERPL-MCNC: 2.4 MG/DL — SIGNIFICANT CHANGE UP (ref 1.8–2.4)
MCHC RBC-ENTMCNC: 30 PG — SIGNIFICANT CHANGE UP (ref 27–31)
MCHC RBC-ENTMCNC: 30.2 PG — SIGNIFICANT CHANGE UP (ref 27–31)
MCHC RBC-ENTMCNC: 32.4 G/DL — SIGNIFICANT CHANGE UP (ref 32–37)
MCHC RBC-ENTMCNC: 32.5 G/DL — SIGNIFICANT CHANGE UP (ref 32–37)
MCV RBC AUTO: 92.4 FL — SIGNIFICANT CHANGE UP (ref 80–94)
MCV RBC AUTO: 92.9 FL — SIGNIFICANT CHANGE UP (ref 80–94)
MONOCYTES # BLD AUTO: 1.63 K/UL — HIGH (ref 0.1–0.6)
MONOCYTES NFR BLD AUTO: 11.3 % — HIGH (ref 1.7–9.3)
NEUTROPHILS # BLD AUTO: 11.93 K/UL — HIGH (ref 1.4–6.5)
NEUTROPHILS NFR BLD AUTO: 82.9 % — HIGH (ref 42.2–75.2)
NRBC # BLD: 0 /100 WBCS — SIGNIFICANT CHANGE UP (ref 0–0)
PHOSPHATE SERPL-MCNC: 3.5 MG/DL — SIGNIFICANT CHANGE UP (ref 2.1–4.9)
PLATELET # BLD AUTO: 247 K/UL — SIGNIFICANT CHANGE UP (ref 130–400)
PLATELET # BLD AUTO: 250 K/UL — SIGNIFICANT CHANGE UP (ref 130–400)
POTASSIUM SERPL-MCNC: 4.6 MMOL/L — SIGNIFICANT CHANGE UP (ref 3.5–5)
POTASSIUM SERPL-SCNC: 4.6 MMOL/L — SIGNIFICANT CHANGE UP (ref 3.5–5)
PROTHROM AB SERPL-ACNC: 12.8 SEC — SIGNIFICANT CHANGE UP (ref 9.95–12.87)
PROTHROM AB SERPL-ACNC: 12.8 SEC — SIGNIFICANT CHANGE UP (ref 9.95–12.87)
RBC # BLD: 3.64 M/UL — LOW (ref 4.7–6.1)
RBC # BLD: 3.67 M/UL — LOW (ref 4.7–6.1)
RBC # FLD: 13.5 % — SIGNIFICANT CHANGE UP (ref 11.5–14.5)
RBC # FLD: 13.5 % — SIGNIFICANT CHANGE UP (ref 11.5–14.5)
SODIUM SERPL-SCNC: 140 MMOL/L — SIGNIFICANT CHANGE UP (ref 135–146)
TROPONIN T SERPL-MCNC: <0.01 NG/ML — SIGNIFICANT CHANGE UP
TROPONIN T SERPL-MCNC: <0.01 NG/ML — SIGNIFICANT CHANGE UP
WBC # BLD: 14.39 K/UL — HIGH (ref 4.8–10.8)
WBC # BLD: 14.62 K/UL — HIGH (ref 4.8–10.8)
WBC # FLD AUTO: 14.39 K/UL — HIGH (ref 4.8–10.8)
WBC # FLD AUTO: 14.62 K/UL — HIGH (ref 4.8–10.8)

## 2018-10-17 PROCEDURE — 99233 SBSQ HOSP IP/OBS HIGH 50: CPT

## 2018-10-17 RX ORDER — HEPARIN SODIUM 5000 [USP'U]/ML
1500 INJECTION INTRAVENOUS; SUBCUTANEOUS
Qty: 25000 | Refills: 0 | Status: DISCONTINUED | OUTPATIENT
Start: 2018-10-17 | End: 2018-10-17

## 2018-10-17 RX ORDER — ATORVASTATIN CALCIUM 80 MG/1
40 TABLET, FILM COATED ORAL AT BEDTIME
Qty: 0 | Refills: 0 | Status: DISCONTINUED | OUTPATIENT
Start: 2018-10-17 | End: 2018-10-20

## 2018-10-17 RX ORDER — MAGNESIUM SULFATE 500 MG/ML
2 VIAL (ML) INJECTION ONCE
Qty: 0 | Refills: 0 | Status: COMPLETED | OUTPATIENT
Start: 2018-10-17 | End: 2018-10-17

## 2018-10-17 RX ORDER — ASPIRIN/CALCIUM CARB/MAGNESIUM 324 MG
325 TABLET ORAL DAILY
Qty: 0 | Refills: 0 | Status: DISCONTINUED | OUTPATIENT
Start: 2018-10-17 | End: 2018-10-20

## 2018-10-17 RX ORDER — HEPARIN SODIUM 5000 [USP'U]/ML
1500 INJECTION INTRAVENOUS; SUBCUTANEOUS
Qty: 25000 | Refills: 0 | Status: DISCONTINUED | OUTPATIENT
Start: 2018-10-17 | End: 2018-10-18

## 2018-10-17 RX ADMIN — Medication 100 MILLIGRAM(S): at 13:42

## 2018-10-17 RX ADMIN — Medication 100 MILLIGRAM(S): at 05:23

## 2018-10-17 RX ADMIN — CHLORHEXIDINE GLUCONATE 1 APPLICATION(S): 213 SOLUTION TOPICAL at 05:08

## 2018-10-17 RX ADMIN — HEPARIN SODIUM 5 UNIT(S)/HR: 5000 INJECTION INTRAVENOUS; SUBCUTANEOUS at 09:15

## 2018-10-17 RX ADMIN — Medication 100 MILLIGRAM(S): at 13:43

## 2018-10-17 RX ADMIN — ATORVASTATIN CALCIUM 40 MILLIGRAM(S): 80 TABLET, FILM COATED ORAL at 21:17

## 2018-10-17 RX ADMIN — Medication 75 MICROGRAM(S): at 05:23

## 2018-10-17 RX ADMIN — Medication 1 TABLET(S): at 13:42

## 2018-10-17 RX ADMIN — Medication 100 MILLIGRAM(S): at 21:17

## 2018-10-17 RX ADMIN — Medication 325 MILLIGRAM(S): at 21:18

## 2018-10-17 RX ADMIN — Medication 50 GRAM(S): at 01:25

## 2018-10-17 RX ADMIN — Medication 325 MILLIGRAM(S): at 13:41

## 2018-10-17 RX ADMIN — Medication 100 MILLIGRAM(S): at 09:12

## 2018-10-17 RX ADMIN — SODIUM CHLORIDE 100 MILLILITER(S): 9 INJECTION, SOLUTION INTRAVENOUS at 09:19

## 2018-10-17 RX ADMIN — HEPARIN SODIUM 15 UNIT(S)/HR: 5000 INJECTION INTRAVENOUS; SUBCUTANEOUS at 09:18

## 2018-10-17 NOTE — CHART NOTE - NSCHARTNOTEFT_GEN_A_CORE
VICKI MAHER  [id number]      7y Male PMH HTN, HCL, Hypothyroidism, ex-smoker, PAD, non-healing ulcer of L great toe, s/p LLE angio on 9/28/18 with severe CFA disease, SFA occlusion, and PT run off, not amenable to endovascular intervention, presents for bypass. Patient underwent uncomplicated L femoral artery endarterectomy, Left femoral to peroneal artery bypass with RSVG.    Indication for ICU admission:  vascular checks and pain control   Admit Date:10-16-18  ICU Date: 10-16-18  OR Date: 10-16-18    No Known Allergies    PAST MEDICAL & SURGICAL HISTORY:  PVD (peripheral vascular disease)  Smoker: HX OF  Hypercholesteremia  Hypothyroidism  HTN (hypertension)  History of surgery: EGD    Home Medications:  aspirin 325 mg oral tablet: 1 tab(s) orally once a day (16 Oct 2018 09:42)  atorvastatin 40 mg oral tablet: 1 tab(s) orally once a day (16 Oct 2018 09:42)  levothyroxine 75 mcg (0.075 mg) oral tablet: 1 tab(s) orally once a day (16 Oct 2018 09:42)  Multiple Vitamins oral capsule: 1 cap(s) orally once a day (16 Oct 2018 09:42)        24HRS EVENT:  1d            DVT PTX:     GI PTX:    ***Tubes/Lines/Drains  ***  Peripheral IV  Central Venous Line     	Date   Arterial Line		                Date   [] PICC:         	[] Midline		[] Mediport             Urinary Catheter		Indication: Strict I&O    Date Placed: VICKI MAHER  [id number]      7y Male PMH HTN, HCL, Hypothyroidism, ex-smoker, PAD, non-healing ulcer of L great toe, s/p LLE angio on 9/28/18 with severe CFA disease, SFA occlusion, and PT run off, not amenable to endovascular intervention, presents for bypass. Patient underwent uncomplicated L femoral artery endarterectomy, Left femoral to peroneal artery bypass with RSVG.  The case was 7 hours EBL 400mL, HD stable throughout, extubated at end of case. Patient now with dopplerable PT bilaterally no DP signals. SICU consulted for q1h vascular checks post operatively.        Indication for ICU admission:  vascular checks and pain control   Admit Date:10-16-18  ICU Date: 10-16-18  OR Date: 10-16-18    No Known Allergies    PAST MEDICAL & SURGICAL HISTORY:  PVD (peripheral vascular disease)  Smoker: HX OF  Hypercholesteremia  Hypothyroidism  HTN (hypertension)  History of surgery: EGD    Home Medications:  aspirin 325 mg oral tablet: 1 tab(s) orally once a day (16 Oct 2018 09:42)  atorvastatin 40 mg oral tablet: 1 tab(s) orally once a day (16 Oct 2018 09:42)  levothyroxine 75 mcg (0.075 mg) oral tablet: 1 tab(s) orally once a day (16 Oct 2018 09:42)  Multiple Vitamins oral capsule: 1 cap(s) orally once a day (16 Oct 2018 09:42)    PLAN:     NEURO: No dx, Alert & Oriented x3, Pain regimen: Tylenol, Oxycodone, Morphine    PULM: Previous smoker; easily extubated at end of case, monitor for respiratory distress, pulse oxymetry, Incentive spirometer, O2NC PRN, maintain Sat >90%    CARDIO: HTN/HCL, Monitor for HD instability, maintain normotensive, MAP >65mmHg, Continue on Home meds: atorvastatin,     GI: No dx, regular diet, IVL, GI ppx with PPI, Bowel regimen: Colace    RENAL/: Wise d/c at 10:40AM...TOV pending     HEME: No dx,  H/H stable at 11.0 DVT ppx: Hep gtt @1500/h. f/u 1600 PTT    ID: Connie-op Ancef, WBC 14.3    VASCULAR: Pulse exam: Palpable (P) Dopplerable (D)  RUE: Radial P  LUE: Radial P  RLE: DP x PT D  LLE: DP x PT D  Vascualr checks: q1h    Lines/Tubes: PIV    Endocrine: Hypothyroid, c/w synthroid home med    Disposition: pt is doing well and hemodynamically stable for transfer.  Sign out given to VICKI MAHER  [id number]      7y Male PMH HTN, HCL, Hypothyroidism, ex-smoker, PAD, non-healing ulcer of L great toe, s/p LLE angio on 9/28/18 with severe CFA disease, SFA occlusion, and PT run off, not amenable to endovascular intervention, presents for bypass. Patient underwent uncomplicated L femoral artery endarterectomy, Left femoral to peroneal artery bypass with RSVG.  The case was 7 hours EBL 400mL, HD stable throughout, extubated at end of case. Patient was admitted to icu for vascular checks and pain control. Patient now with palpable PT b/l and dopplerable  DP signals.       Indication for ICU admission:  vascular checks and pain control   Admit Date:10-16-18  ICU Date: 10-16-18  OR Date: 10-16-18    No Known Allergies    PAST MEDICAL & SURGICAL HISTORY:  PVD (peripheral vascular disease)  Smoker: HX OF  Hypercholesteremia  Hypothyroidism  HTN (hypertension)  History of surgery: EGD    Home Medications:  aspirin 325 mg oral tablet: 1 tab(s) orally once a day (16 Oct 2018 09:42)  atorvastatin 40 mg oral tablet: 1 tab(s) orally once a day (16 Oct 2018 09:42)  levothyroxine 75 mcg (0.075 mg) oral tablet: 1 tab(s) orally once a day (16 Oct 2018 09:42)  Multiple Vitamins oral capsule: 1 cap(s) orally once a day (16 Oct 2018 09:42)    PLAN:     NEURO: No dx, Alert & Oriented x3, Pain regimen: Tylenol, Oxycodone, Morphine    PULM: Previous smoker; easily extubated at end of case, monitor for respiratory distress, pulse oxymetry, Incentive spirometer, O2NC PRN, maintain Sat >90%    CARDIO: HTN/HCL, Monitor for HD instability, maintain normotensive, MAP >65mmHg, Continue on Home meds: atorvastatin,     GI: No dx, regular diet, IVL, GI ppx with PPI, Bowel regimen: Colace    RENAL/: Wise d/c at 10:40AM...TOV pending     HEME: No dx,  H/H stable at 11.0 DVT ppx: Hep gtt @1500/h. f/u 1600 PTT    ID: Connie-op Ancef, WBC 14.3    VASCULAR: Pulse exam: Palpable (P) Dopplerable (D)  RUE: Radial P  LUE: Radial P  RLE: DP x PT D  LLE: DP x PT D  Vascualr checks: q1h    Lines/Tubes: PIV    Endocrine: Hypothyroid, c/w synthroid home med    Disposition: pt is doing well and hemodynamically stable for transfer.  Sign out given to Lian Gold @ 2:30pm

## 2018-10-17 NOTE — PROGRESS NOTE ADULT - SUBJECTIVE AND OBJECTIVE BOX
Post Operative Day:1  Procedure:s/p fem-peroneal bypass with rvsg  Patient is a 77y old  Male who presents with a chief complaint of Pt denies cp palp uri cough dysuria or sob. ET 1 FOS denies SOB . scheduled for LLE angiogram possible endovascular revascularization. PT had s/p fall in house 2018 and left foot great toe was injured. pt had stitches placed and removed since  but slow healing noted per MD. C/O GREAT TOE SLIGHT SANGUINOUS DRAINAGE WITH WITH BAND AID IN PLACE L TOE.  Pt underwent a diagnostic angiogram in 2018 showing severe CFA disease and SFA occlusion, and now returns for recommeded for femoral to peroneal artery bypass (16 Oct 2018 09:51)    PAST MEDICAL & SURGICAL HISTORY:  PVD (peripheral vascular disease)  Smoker: HX OF  Hypercholesteremia  Hypothyroidism  HTN (hypertension)  History of surgery: EGD      Events of the Last 24h:none  Vital Signs Last 24 Hrs  T(C): 36.8 (16 Oct 2018 23:01), Max: 36.8 (16 Oct 2018 21:24)  T(F): 98.3 (16 Oct 2018 23:01), Max: 98.3 (16 Oct 2018 22:01)  HR: 95 (17 Oct 2018 00:01) (87 - 98)  BP: 118/63 (17 Oct 2018 00:01) (113/63 - 159/84)  BP(mean): 79 (17 Oct 2018 00:01) (78 - 100)  RR: 16 (17 Oct 2018 00:01) (15 - 28)  SpO2: 95% (17 Oct 2018 00:01) (95% - 98%)        Diet, DASH/TLC:   Sodium & Cholesterol Restricted (10-16-18 @ 21:47)      I&O's Summary    16 Oct 2018 07:  -  17 Oct 2018 00:45  --------------------------------------------------------  IN: 338 mL / OUT: 140 mL / NET: 198 mL     I&O's Detail    16 Oct 2018 07:  -  17 Oct 2018 00:45  --------------------------------------------------------  IN:    heparin Infusion: 8 mL    IV PiggyBack: 50 mL    lactated ringers.: 180 mL    Oral Fluid: 100 mL  Total IN: 338 mL    OUT:    Indwelling Catheter - Urethral: 140 mL  Total OUT: 140 mL    Total NET: 198 mL          MEDICATIONS  (STANDING):  aspirin 325 milliGRAM(s) Oral daily  atorvastatin Oral Tab/Cap - Peds 40 milliGRAM(s) Oral daily  ceFAZolin   IVPB 1000 milliGRAM(s) IV Intermittent every 8 hours  chlorhexidine 4% Liquid 1 Application(s) Topical <User Schedule>  docusate sodium 100 milliGRAM(s) Oral every 8 hours  heparin  Infusion 500 Unit(s)/Hr (5 mL/Hr) IV Continuous <Continuous>  lactated ringers. 1000 milliLiter(s) (125 mL/Hr) IV Continuous <Continuous>  lactated ringers. 1000 milliLiter(s) (100 mL/Hr) IV Continuous <Continuous>  levothyroxine 75 MICROGram(s) Oral daily  multivitamin 1 Tablet(s) Oral daily    MEDICATIONS  (PRN):  acetaminophen   Tablet .. 650 milliGRAM(s) Oral every 4 hours PRN Mild Pain (1 - 3)  HYDROmorphone  Injectable 0.5 milliGRAM(s) IV Push every 10 minutes PRN Moderate Pain (4 - 6)  HYDROmorphone  Injectable 1 milliGRAM(s) IV Push every 10 minutes PRN Severe Pain (7 - 10)  meperidine     Injectable 12.5 milliGRAM(s) IV Push every 10 minutes PRN Shivering  morphine  - Injectable 2 milliGRAM(s) IV Push every 4 hours PRN severe breakthrough pain  ondansetron Injectable 4 milliGRAM(s) IV Push once PRN Nausea and/or Vomiting  oxyCODONE    IR 5 milliGRAM(s) Oral once PRN Moderate Pain (4 - 6)  oxyCODONE    IR 10 milliGRAM(s) Oral once PRN Severe Pain (7 - 10)  oxyCODONE    IR 5 milliGRAM(s) Oral every 4 hours PRN Moderate Pain (4 - 6)  oxyCODONE    IR 10 milliGRAM(s) Oral every 4 hours PRN Severe Pain (7 - 10)      PHYSICAL EXAM:    GENERAL: NAD    HEENT: NCAT    CHEST/LUNGS: CTAB    HEART: RRR,  No murmurs, rubs, or gallops    ABDOMEN: SNTND +BS    EXTREMITIES:  FROM, No clubbing, cyanosis, or edema, palpable pulse, incision clean dry and intact     NEURO: No focal neurological deficits    SKIN: No rashes or lesions    INCISION/WOUNDS:                          11.6   18.78 )-----------( 272      ( 16 Oct 2018 22:30 )             35.3        CBC Full  -  ( 16 Oct 2018 22:30 )  WBC Count : 18.78 K/uL  Hemoglobin : 11.6 g/dL  Hematocrit : 35.3 %  Platelet Count - Automated : 272 K/uL  Mean Cell Volume : 92.4 fL  Mean Cell Hemoglobin : 30.4 pg  Mean Cell Hemoglobin Concentration : 32.9 g/dL  Auto Neutrophil # : 16.81 K/uL  Auto Lymphocyte # : 0.67 K/uL  Auto Monocyte # : 1.14 K/uL  Auto Eosinophil # : 0.01 K/uL  Auto Basophil # : 0.05 K/uL  Auto Neutrophil % : 89.4 %  Auto Lymphocyte % : 3.6 %  Auto Monocyte % : 6.1 %  Auto Eosinophil % : 0.1 %  Auto Basophil % : 0.3 %               138   |  101   |  15                 Ca: 7.9    BMP:   ----------------------------< 121    M.8   (10-16-18 @ 22:30)             4.4    |  22    | 0.9                Ph: x            PT/INR - ( 16 Oct 2018 22:30 )   PT: 12.00 sec;   INR: 1.04 ratio         PTT - ( 16 Oct 2018 22:30 )  PTT:29.4 sec

## 2018-10-17 NOTE — CONSULT NOTE ADULT - ASSESSMENT

## 2018-10-17 NOTE — PROGRESS NOTE ADULT - SUBJECTIVE AND OBJECTIVE BOX
VICKI MAHER  [id number]  77y Male    Indication for ICU admission:   Admit Date:10-16-18  ICU Date: 10/16/18  OR Date: 10/16/18    HPI:   77y Male PMH HTN, HCL, Hypothyroidism, ex-smoker, PAD, non-healing ulcer of L great toe, s/p LLE angio on 9/28/18 with severe CFA disease, SFA occlusion, and PT run off, not amenable to endovascular intervention, presents for bypass. Patient underwent uncomplicated L femoral artery endarterectomy, Left femoral to peroneal artery bypass with RSVG. Case was 7 hours EBL 400mL, HD stable throughout, extubated at end of case. Patient now with dopplerable PT bilaterally no DP signals. SICU consulted for q1h vascular checks post operatively.    Allergies:   No Known Allergies    PAST MEDICAL & SURGICAL HISTORY:  PVD (peripheral vascular disease)  Smoker: HX OF  Hypercholesteremia  Hypothyroidism  HTN (hypertension)  History of surgery: EGD    Home Medications:  aspirin 325 mg oral tablet: 1 tab(s) orally once a day (16 Oct 2018 09:42)  atorvastatin 40 mg oral tablet: 1 tab(s) orally once a day (16 Oct 2018 09:42)  levothyroxine 75 mcg (0.075 mg) oral tablet: 1 tab(s) orally once a day (16 Oct 2018 09:42)  Multiple Vitamins oral capsule: 1 cap(s) orally once a day (16 Oct 2018 09:42)    24HRS EVENT:  S/P uncomplicated L femoral artery endarterectomy, Left femoral to peroneal artery bypass with RSVG  Surgery Information  OR time: 7h 22min     EBL: 400         IV Fluids: 2600      Blood Products: none, HD stable throughout  UOP: 500 (mcneal)       NEURO: No dx, Monitor for changes in mental status, Pain regimen: Tylenol, Oxycodone, Morphine  PULM: Previous smoker extubated at end of case, monitor for respiratory distress, pulse oxymetry, Incentive spirometer, O2NC PRN, maintain Sat >90%  CARDIO: HTN/HCL, Monitor for HD instability, maintain normotensive, MAP >65mmHg, Continue on Home meds: atorvastatin,   GI: No dx, regular diet, IVL, GI ppx with PPI, Bowel regimen: Colace  RENAL/: No dx, Mcneal in place, monitor UOP: _____ , Strict I+O, correct electrolytes  HEME: No dx, Monitor H/H: 11.6/35.3, DVT ppx: Hep gtt @500/h do not titrate; PTT 29.4 post-op  ID: Connie-op Ancef, Monitor for fevers, leukocytosis, WBC: 18.78  VASCULAR: Pulse exam: Palpable (P) Dopplerable (D); RLE: DP x PT D; LLE: DP x PT D. Vascular checks: q1h  Endocrine: Hypothyroid, c/w synthroid home med    DVT PTX: Hep gtt @500/h  GI PTX: PPI    Code Status: Full Code    ***Tubes/Lines/Drains  ***  Peripheral IV  Urinary Catheter		Indication: Strict I&O    Date Placed: 10/16/18    REVIEW OF SYSTEMS  [x] A ten-point review of systems was otherwise negative except as noted. VICKI MAHER  [id number]  77y Male    Indication for ICU admission:   Admit Date:10-16-18  ICU Date: 10/16/18  OR Date: 10/16/18    HPI:   77y Male PMH HTN, HCL, Hypothyroidism, ex-smoker, PAD, non-healing ulcer of L great toe, s/p LLE angio on 18 with severe CFA disease, SFA occlusion, and PT run off, not amenable to endovascular intervention, presents for bypass. Patient underwent uncomplicated L femoral artery endarterectomy, Left femoral to peroneal artery bypass with RSVG. Case was 7 hours EBL 400mL, HD stable throughout, extubated at end of case. Patient now with dopplerable PT bilaterally no DP signals. SICU consulted for q1h vascular checks post operatively.    Allergies:   No Known Allergies    PAST MEDICAL & SURGICAL HISTORY:  PVD (peripheral vascular disease)  Smoker: HX OF  Hypercholesteremia  Hypothyroidism  HTN (hypertension)  History of surgery: EGD    Home Medications:  aspirin 325 mg oral tablet: 1 tab(s) orally once a day (16 Oct 2018 09:42)  atorvastatin 40 mg oral tablet: 1 tab(s) orally once a day (16 Oct 2018 09:42)  levothyroxine 75 mcg (0.075 mg) oral tablet: 1 tab(s) orally once a day (16 Oct 2018 09:42)  Multiple Vitamins oral capsule: 1 cap(s) orally once a day (16 Oct 2018 09:42)    24HRS EVENT:  S/P uncomplicated L femoral artery endarterectomy, Left femoral to peroneal artery bypass with RSVG  Surgery Information  OR time: 7h 22min     EBL: 400         IV Fluids: 2600      Blood Products: none, HD stable throughout  UOP: 500 (mcneal)       NEURO: No dx, Monitor for changes in mental status, Pain regimen: Tylenol, Oxycodone, Morphine  PULM: Previous smoker extubated at end of case, monitor for respiratory distress, pulse oxymetry, Incentive spirometer, O2NC PRN, maintain Sat >90%; RR: 12 (17 Oct 2018 06:00) (10 - 28); SpO2: 98% (17 Oct 2018 06:00) (93% - 99%)  CARDIO: HTN/HCL, Monitor for HD instability, maintain normotensive, MAP >65mmHg, Continue on Home meds: atorvastatin,   HR: 86 (17 Oct 2018 06:00) (86 - 98)  BP: 126/63 (17 Oct 2018 06:00) (107/65 - 159/84)  BP(mean): 95 (17 Oct 2018 06:00) (76 - 100)  GI: No dx, regular diet, IVL, GI ppx with PPI, Bowel regimen: Colace  RENAL/: No dx, Mcneal in place, monitor UOP: /h, Strict I+O, correct electrolytes; Mag repleted 2g MgSO4  HEME: No dx, Monitor H/H: 11.6/35.3, DVT ppx: Hep gtt @500/h do not titrate; PTT 29.4 post-op  ID: Connie-op Ancef, Monitor for fevers, leukocytosis, WBC: 18.78, T(F): 99.1 (17 Oct 2018 04:30), Max: 99.3 (17 Oct 2018 02:01)  VASCULAR: Pulse exam: Palpable (P) Dopplerable (D); RLE: DP x PT D; LLE: DP x PT D. Vascular checks: q1h  Hepgtt increased from 500 -> 1500 as per primary team, PTT 29  Endocrine: Hypothyroid, c/w synthroid home med    DVT PTX: Hep gtt @500/h  GI PTX: PPI    Code Status: Full Code    ***Tubes/Lines/Drains  ***  Peripheral IV  Urinary Catheter		Indication: Strict I&O    Date Placed: 10/16/18    REVIEW OF SYSTEMS  [x] A ten-point review of systems was otherwise negative except as noted.    HD: 1d  Daily Height in cm: 190.5 (16 Oct 2018 12:34)    Daily Weight in k (17 Oct 2018 03:00)  Diet, DASH/TLC:   Sodium & Cholesterol Restricted (10-16-18 @ 21:47)    CURRENT MEDS:  Neurologic Medications  acetaminophen   Tablet .. 650 milliGRAM(s) Oral every 4 hours PRN Mild Pain (1 - 3)  aspirin 325 milliGRAM(s) Oral daily  morphine  - Injectable 2 milliGRAM(s) IV Push every 4 hours PRN severe breakthrough pain  oxyCODONE    IR 5 milliGRAM(s) Oral every 4 hours PRN Moderate Pain (4 - 6)  oxyCODONE    IR 10 milliGRAM(s) Oral every 4 hours PRN Severe Pain (7 - 10)    Gastrointestinal Medications  docusate sodium 100 milliGRAM(s) Oral every 8 hours  lactated ringers. 1000 milliLiter(s) IV Continuous <Continuous>  multivitamin 1 Tablet(s) Oral daily    Hematologic/Oncologic Medications  heparin  Infusion 1500 Unit(s)/Hr IV Continuous <Continuous>    Antimicrobial/Immunologic Medications  ceFAZolin   IVPB 1000 milliGRAM(s) IV Intermittent every 8 hours    Endocrine/Metabolic Medications  atorvastatin Oral Tab/Cap - Peds 40 milliGRAM(s) Oral daily  levothyroxine 75 MICROGram(s) Oral daily    Topical/Other Medications  chlorhexidine 4% Liquid 1 Application(s) Topical <User Schedule>    ICU Vital Signs Last 24 Hrs  T(C): 37.3 (17 Oct 2018 04:30), Max: 37.4 (17 Oct 2018 02:01)  T(F): 99.1 (17 Oct 2018 04:30), Max: 99.3 (17 Oct 2018 02:01)  HR: 86 (17 Oct 2018 06:00) (86 - 98)  BP: 126/63 (17 Oct 2018 06:00) (107/65 - 159/84)  BP(mean): 95 (17 Oct 2018 06:00) (76 - 100)  RR: 12 (17 Oct 2018 06:00) (10 - 28)  SpO2: 98% (17 Oct 2018 06:00) (93% - 99%)    I&O's Summary  16 Oct 2018 07:  -  17 Oct 2018 07:00  --------------------------------------------------------  IN: 1013 mL / OUT: 630 mL / NET: 383 mL    I&O's Detail  16 Oct 2018 07:  -  17 Oct 2018 07:00  --------------------------------------------------------  IN:    heparin Infusion: 33 mL    IV PiggyBack: 100 mL    lactated ringers.: 780 mL    Oral Fluid: 100 mL  Total IN: 1013 mL  OUT:    Indwelling Catheter - Urethral: 630 mL  Total OUT: 630 mL  Total NET: 383 mL    PHYSICAL EXAM:  General/Neuro  Exam: NAD, alert, oriented x 3, no focal deficits. PERRLA     Respiratory  Exam: Lungs clear to auscultation, Normal expansion/effort.     Cardiovascular  Exam: S1, S2.  Regular rate and rhythm.  No peripheral edema  Cardiac Rhythm: Normal Sinus Rhythm    GI  Exam: Abdomen soft, Non-tender, Non-distended.  +BS    Tubes/Lines/Drains   [x] Peripheral IV  [x] Urinary Catheter		Date Placed: 10/16    Extremities  Exam: Extremities warm, pink, well-perfused.  Syed hugger in place. L great toe ulcer. Medial thigh and medial lower leg incision with dressings in place, C/D/I    Vascular: Doppler signals in BL PT no DP signal    Derm:  Exam: Good skin turgor, no skin breakdown.  No mottling/color changes noted    :   Exam: Mcneal catheter in place. Making good yellow urine    LABS:  CAPILLARY BLOOD GLUCOSE                        11.0   14.39 )-----------( 250      ( 17 Oct 2018 04:36 )             33.9       Auto Neutrophil %: 82.9 % (10-17-18 @ 04:36)  Auto Immature Granulocyte %: 0.5 % (10-17-18 @ 04:36)  Auto Neutrophil %: 89.4 % (10-16-18 @ 22:30)  Auto Immature Granulocyte %: 0.5 % (10-16-18 @ 22:30)    10-17  140  |  103  |  13  ----------------------------<  109<H>  4.6   |  24  |  0.9    Calcium, Total Serum: 8.0 mg/dL (10-17-18 @ 04:36)    Coags:  12.80  ----< 1.11    ( 17 Oct 2018 04:36 )     29.8

## 2018-10-17 NOTE — CONSULT NOTE ADULT - SUBJECTIVE AND OBJECTIVE BOX
HPI: 76 yo M admitted on 10/16 for LLE bypass for claudication. Prior to hospitalization he was ambulating independent      PAST MEDICAL & SURGICAL HISTORY:  PVD (peripheral vascular disease)  Smoker: HX OF  Hypercholesteremia  Hypothyroidism  HTN (hypertension)  History of surgery: EGD      Hospital Course:    TODAY'S SUBJECTIVE & REVIEW OF SYMPTOMS:     Constitutional WNL   Cardio WNL   Resp WNL   GI WNL  Heme WNL  Endo WNL  Skin WNL  MSK Weakness  Neuro WNL  Cognitive WNL  Psych WNL      MEDICATIONS  (STANDING):  aspirin 325 milliGRAM(s) Oral daily  atorvastatin 40 milliGRAM(s) Oral at bedtime  chlorhexidine 4% Liquid 1 Application(s) Topical <User Schedule>  docusate sodium 100 milliGRAM(s) Oral every 8 hours  heparin  Infusion 1500 Unit(s)/Hr (15 mL/Hr) IV Continuous <Continuous>  lactated ringers. 1000 milliLiter(s) (100 mL/Hr) IV Continuous <Continuous>  levothyroxine 75 MICROGram(s) Oral daily  multivitamin 1 Tablet(s) Oral daily    MEDICATIONS  (PRN):  acetaminophen   Tablet .. 650 milliGRAM(s) Oral every 4 hours PRN Mild Pain (1 - 3)  morphine  - Injectable 2 milliGRAM(s) IV Push every 4 hours PRN severe breakthrough pain  oxyCODONE    IR 5 milliGRAM(s) Oral every 4 hours PRN Moderate Pain (4 - 6)  oxyCODONE    IR 10 milliGRAM(s) Oral every 4 hours PRN Severe Pain (7 - 10)      FAMILY HISTORY:      Allergies    No Known Allergies    Intolerances        SOCIAL HISTORY:    [  ] Etoh  [  ] Smoking  [  ] Substance abuse     Home Environment:  [x  ] Home Alone  [  ] Lives with Family  [  ] Home Health Aid    Dwelling:  [  ] Apartment  [x  ] Private House  [  ] Adult Home  [  ] Skilled Nursing Facility      [  ] Short Term  [  ] Long Term  [x  ] Stairs       Elevator [  ]    FUNCTIONAL STATUS PTA: (Check all that apply)  Ambulation: [ x  ]Independent    [  ] Dependent     [  ] Non-Ambulatory  Assistive Device: [  ] SA Cane  [  ]  Q Cane  [  ] Walker  [  ]  Wheelchair  ADL : [x  ] Independent  [  ]  Dependent       Vital Signs Last 24 Hrs  T(C): 36.8 (17 Oct 2018 12:00), Max: 37.4 (17 Oct 2018 02:01)  T(F): 98.3 (17 Oct 2018 12:00), Max: 99.3 (17 Oct 2018 02:01)  HR: 92 (17 Oct 2018 15:00) (86 - 98)  BP: 119/64 (17 Oct 2018 15:00) (107/65 - 148/72)  BP(mean): 87 (17 Oct 2018 15:00) (76 - 105)  RR: 13 (17 Oct 2018 15:00) (10 - 28)  SpO2: 97% (17 Oct 2018 15:00) (93% - 99%)      PHYSICAL EXAM: Alert & Oriented X3  GENERAL: NAD, well-groomed, well-developed  HEAD:  Atraumatic, Normocephalic  CHEST/LUNG: Clear   HEART: S1S2+  ABDOMEN: Soft, Nontender  EXTREMITIES:  no calf tenderness    NERVOUS SYSTEM:  Cranial Nerves 2-12 intact [  ] Abnormal  [  ]  ROM: WFL all extremities [  ]  Abnormal [x  ]limited lle  Motor Strength: WFL all extremities  [  ]  Abnormal [ x ]limited lle  Sensation: intact to light touch [  ] Abnormal [ x ]decreased light touch distal lle  Reflexes: Symmetric [  ]  Abnormal [  ]    FUNCTIONAL STATUS:  Bed Mobility: Independent [  ]  Supervision [  ]  Needs Assistance [x  ]  N/A [  ]  Transfers: Independent [  ]  Supervision [  ]  Needs Assistance [ x ]  N/A [  ]   Ambulation: Independent [  ]  Supervision [  ]  Needs Assistance [  ]  N/A [  ]  ADL: Independent [  ] Requires Assistance [  ] N/A [  ]      LABS:                        11.0   14.39 )-----------( 250      ( 17 Oct 2018 04:36 )             33.9     10-17    140  |  103  |  13  ----------------------------<  109<H>  4.6   |  24  |  0.9    Ca    8.0<L>      17 Oct 2018 04:36  Phos  3.5     10-17  Mg     2.4     10-17      PT/INR - ( 17 Oct 2018 04:36 )   PT: 12.80 sec;   INR: 1.11 ratio         PTT - ( 17 Oct 2018 04:36 )  PTT:29.8 sec      RADIOLOGY & ADDITIONAL STUDIES:    Assesment:

## 2018-10-18 LAB
ANION GAP SERPL CALC-SCNC: 10 MMOL/L — SIGNIFICANT CHANGE UP (ref 7–14)
APTT BLD: 68.1 SEC — HIGH (ref 27–39.2)
BUN SERPL-MCNC: 11 MG/DL — SIGNIFICANT CHANGE UP (ref 10–20)
CALCIUM SERPL-MCNC: 7.8 MG/DL — LOW (ref 8.5–10.1)
CHLORIDE SERPL-SCNC: 104 MMOL/L — SIGNIFICANT CHANGE UP (ref 98–110)
CK MB CFR SERPL CALC: 3.9 NG/ML — SIGNIFICANT CHANGE UP (ref 0.6–6.3)
CK SERPL-CCNC: 439 U/L — HIGH (ref 0–225)
CO2 SERPL-SCNC: 27 MMOL/L — SIGNIFICANT CHANGE UP (ref 17–32)
CREAT SERPL-MCNC: 0.9 MG/DL — SIGNIFICANT CHANGE UP (ref 0.7–1.5)
GLUCOSE SERPL-MCNC: 119 MG/DL — HIGH (ref 70–99)
HCT VFR BLD CALC: 29.6 % — LOW (ref 42–52)
HGB BLD-MCNC: 9.5 G/DL — LOW (ref 14–18)
INR BLD: 1.2 RATIO — SIGNIFICANT CHANGE UP (ref 0.65–1.3)
MAGNESIUM SERPL-MCNC: 2.2 MG/DL — SIGNIFICANT CHANGE UP (ref 1.8–2.4)
MCHC RBC-ENTMCNC: 29.8 PG — SIGNIFICANT CHANGE UP (ref 27–31)
MCHC RBC-ENTMCNC: 32.1 G/DL — SIGNIFICANT CHANGE UP (ref 32–37)
MCV RBC AUTO: 92.8 FL — SIGNIFICANT CHANGE UP (ref 80–94)
NRBC # BLD: 0 /100 WBCS — SIGNIFICANT CHANGE UP (ref 0–0)
PHOSPHATE SERPL-MCNC: 2.2 MG/DL — SIGNIFICANT CHANGE UP (ref 2.1–4.9)
PLATELET # BLD AUTO: 220 K/UL — SIGNIFICANT CHANGE UP (ref 130–400)
POTASSIUM SERPL-MCNC: 4.5 MMOL/L — SIGNIFICANT CHANGE UP (ref 3.5–5)
POTASSIUM SERPL-SCNC: 4.5 MMOL/L — SIGNIFICANT CHANGE UP (ref 3.5–5)
PROTHROM AB SERPL-ACNC: 13.8 SEC — HIGH (ref 9.95–12.87)
RBC # BLD: 3.19 M/UL — LOW (ref 4.7–6.1)
RBC # FLD: 13.8 % — SIGNIFICANT CHANGE UP (ref 11.5–14.5)
SODIUM SERPL-SCNC: 141 MMOL/L — SIGNIFICANT CHANGE UP (ref 135–146)
TROPONIN T SERPL-MCNC: <0.01 NG/ML — SIGNIFICANT CHANGE UP
WBC # BLD: 10.51 K/UL — SIGNIFICANT CHANGE UP (ref 4.8–10.8)
WBC # FLD AUTO: 10.51 K/UL — SIGNIFICANT CHANGE UP (ref 4.8–10.8)

## 2018-10-18 RX ADMIN — Medication 75 MICROGRAM(S): at 06:01

## 2018-10-18 RX ADMIN — Medication 1 TABLET(S): at 13:29

## 2018-10-18 RX ADMIN — Medication 325 MILLIGRAM(S): at 13:29

## 2018-10-18 RX ADMIN — CHLORHEXIDINE GLUCONATE 1 APPLICATION(S): 213 SOLUTION TOPICAL at 13:36

## 2018-10-18 RX ADMIN — SODIUM CHLORIDE 100 MILLILITER(S): 9 INJECTION, SOLUTION INTRAVENOUS at 05:16

## 2018-10-18 RX ADMIN — ATORVASTATIN CALCIUM 40 MILLIGRAM(S): 80 TABLET, FILM COATED ORAL at 21:21

## 2018-10-18 RX ADMIN — OXYCODONE HYDROCHLORIDE 5 MILLIGRAM(S): 5 TABLET ORAL at 13:32

## 2018-10-18 RX ADMIN — Medication 100 MILLIGRAM(S): at 13:29

## 2018-10-18 NOTE — PROGRESS NOTE ADULT - SUBJECTIVE AND OBJECTIVE BOX
Progress Note: General Surgery  Patient: VICKI MAHER , 77y (1940)Male   MRN: [id number]  Location: [address]  Visit: 10-16-18 Inpatient  Date: 10-18-18 @ 10:20  Hospital Day:   Post-op Day:     Procedure/Diagnosis: s/p LLE femoral to peroneal RSVG   Events over 24h:  This morning dressings over LLE incisional wound were soaked with blood.  Heparin drip was held.  Patient was out of bed to chair yesterday and stated that he felt good while sitting up.  States the pain in LLE is minimal, controlled with pain medication.  Denies fever, chills, chest pain, shortness of breath, nausea, vomiting.    Vitals: T(F): 96.9 (10-18-18 @ 07:35), Max: 98.5 (10-17-18 @ 18:30)  HR: 91 (10-18-18 @ 07:35)  BP: 120/62 (10-18-18 @ 07:35) (111/67 - 148/72)  RR: 18 (10-18-18 @ 07:35)  SpO2: 97% (10-18-18 @ 07:35)    In:   10-17-18 @ 07:01  -  10-18-18 @ 07:00  --------------------------------------------------------  IN: 3525 mL      Out:   10-17-18 @ 07:01  -  10-18-18 @ 07:00  --------------------------------------------------------  OUT:    Indwelling Catheter - Urethral: 755 mL    Voided: 2260 mL  Total OUT: 3015 mL        Net:   10-17-18 @ 07:01  -  10-18-18 @ 07:00  --------------------------------------------------------  NET: 510 mL      Diet: Diet, DASH/TLC:   Sodium & Cholesterol Restricted (10-16-18 @ 21:47)    IV Fluids: yes no , Type: lactated ringers. 1000 milliLiter(s) (100 mL/Hr) IV Continuous <Continuous>  multivitamin 1 Tablet(s) Oral daily    Physical Examination:  General Appearance: NAD, alert and cooperative  HEENT: NCAT, WNL  Heart: S1 and S2. No murmurs. Rhythm is regular  Lungs: Clear to auscultation BL   Abdomen:  Positive bowel sounds. Soft, nondistended, nontender   MSK/Extremities: ROM intact BL upper/lower extremities, dressing over LLE soaked with blood, dopplerable PT/DP bilateral LEs    Medications: [Standing]  aspirin 325 milliGRAM(s) Oral daily  atorvastatin 40 milliGRAM(s) Oral at bedtime  chlorhexidine 4% Liquid 1 Application(s) Topical <User Schedule>  docusate sodium 100 milliGRAM(s) Oral every 8 hours  lactated ringers. 1000 milliLiter(s) (100 mL/Hr) IV Continuous <Continuous>  levothyroxine 75 MICROGram(s) Oral daily  multivitamin 1 Tablet(s) Oral daily    DVT Prophylaxis:   GI Prophylaxis:   Antibiotics:   Anticoagulation:   Medications:[PRN]  acetaminophen   Tablet .. 650 milliGRAM(s) Oral every 4 hours PRN  morphine  - Injectable 2 milliGRAM(s) IV Push every 4 hours PRN  oxyCODONE    IR 5 milliGRAM(s) Oral every 4 hours PRN  oxyCODONE    IR 10 milliGRAM(s) Oral every 4 hours PRN    Labs:                        9.5    10.51 )-----------( 220      ( 18 Oct 2018 01:37 )             29.6     10-18    141  |  104  |  11  ----------------------------<  119<H>  4.5   |  27  |  0.9    Ca    7.8<L>      18 Oct 2018 01:37  Phos  2.2     10-18  Mg     2.2     10-18        PT/INR - ( 18 Oct 2018 01:37 )   PT: 13.80 sec;   INR: 1.20 ratio         PTT - ( 18 Oct 2018 01:37 )  PTT:68.1 sec    CARDIAC MARKERS ( 18 Oct 2018 01:37 )  x     / <0.01 ng/mL / 439 U/L / x     / 3.9 ng/mL  CARDIAC MARKERS ( 17 Oct 2018 17:36 )  x     / <0.01 ng/mL / 685 U/L / x     / 6.3 ng/mL  CARDIAC MARKERS ( 17 Oct 2018 12:07 )  x     / <0.01 ng/mL / 598 U/L / x     / 6.3 ng/mL

## 2018-10-19 ENCOUNTER — TRANSCRIPTION ENCOUNTER (OUTPATIENT)
Age: 78
End: 2018-10-19

## 2018-10-19 LAB
ANION GAP SERPL CALC-SCNC: 9 MMOL/L — SIGNIFICANT CHANGE UP (ref 7–14)
APTT BLD: 28.3 SEC — SIGNIFICANT CHANGE UP (ref 27–39.2)
BUN SERPL-MCNC: 7 MG/DL — LOW (ref 10–20)
CALCIUM SERPL-MCNC: 7.9 MG/DL — LOW (ref 8.5–10.1)
CHLORIDE SERPL-SCNC: 104 MMOL/L — SIGNIFICANT CHANGE UP (ref 98–110)
CO2 SERPL-SCNC: 29 MMOL/L — SIGNIFICANT CHANGE UP (ref 17–32)
CREAT SERPL-MCNC: 0.7 MG/DL — SIGNIFICANT CHANGE UP (ref 0.7–1.5)
GLUCOSE SERPL-MCNC: 99 MG/DL — SIGNIFICANT CHANGE UP (ref 70–99)
HCT VFR BLD CALC: 25.5 % — LOW (ref 42–52)
HGB BLD-MCNC: 8.4 G/DL — LOW (ref 14–18)
INR BLD: 1.08 RATIO — SIGNIFICANT CHANGE UP (ref 0.65–1.3)
MAGNESIUM SERPL-MCNC: 2 MG/DL — SIGNIFICANT CHANGE UP (ref 1.8–2.4)
MCHC RBC-ENTMCNC: 30.5 PG — SIGNIFICANT CHANGE UP (ref 27–31)
MCHC RBC-ENTMCNC: 32.9 G/DL — SIGNIFICANT CHANGE UP (ref 32–37)
MCV RBC AUTO: 92.7 FL — SIGNIFICANT CHANGE UP (ref 80–94)
NRBC # BLD: 0 /100 WBCS — SIGNIFICANT CHANGE UP (ref 0–0)
PLATELET # BLD AUTO: 213 K/UL — SIGNIFICANT CHANGE UP (ref 130–400)
POTASSIUM SERPL-MCNC: 4.4 MMOL/L — SIGNIFICANT CHANGE UP (ref 3.5–5)
POTASSIUM SERPL-SCNC: 4.4 MMOL/L — SIGNIFICANT CHANGE UP (ref 3.5–5)
PROTHROM AB SERPL-ACNC: 12.4 SEC — SIGNIFICANT CHANGE UP (ref 9.95–12.87)
RBC # BLD: 2.75 M/UL — LOW (ref 4.7–6.1)
RBC # FLD: 13.6 % — SIGNIFICANT CHANGE UP (ref 11.5–14.5)
SODIUM SERPL-SCNC: 142 MMOL/L — SIGNIFICANT CHANGE UP (ref 135–146)
SURGICAL PATHOLOGY STUDY: SIGNIFICANT CHANGE UP
WBC # BLD: 9.99 K/UL — SIGNIFICANT CHANGE UP (ref 4.8–10.8)
WBC # FLD AUTO: 9.99 K/UL — SIGNIFICANT CHANGE UP (ref 4.8–10.8)

## 2018-10-19 RX ORDER — ENOXAPARIN SODIUM 100 MG/ML
40 INJECTION SUBCUTANEOUS EVERY 24 HOURS
Qty: 0 | Refills: 0 | Status: DISCONTINUED | OUTPATIENT
Start: 2018-10-19 | End: 2018-10-20

## 2018-10-19 RX ADMIN — ATORVASTATIN CALCIUM 40 MILLIGRAM(S): 80 TABLET, FILM COATED ORAL at 21:10

## 2018-10-19 RX ADMIN — Medication 75 MICROGRAM(S): at 05:22

## 2018-10-19 RX ADMIN — ENOXAPARIN SODIUM 40 MILLIGRAM(S): 100 INJECTION SUBCUTANEOUS at 21:10

## 2018-10-19 RX ADMIN — Medication 325 MILLIGRAM(S): at 14:01

## 2018-10-19 RX ADMIN — CHLORHEXIDINE GLUCONATE 1 APPLICATION(S): 213 SOLUTION TOPICAL at 05:22

## 2018-10-19 RX ADMIN — Medication 1 TABLET(S): at 14:01

## 2018-10-19 NOTE — DISCHARGE NOTE ADULT - CARE PROVIDER_API CALL
Anders Tavarez), Surgery; Vascular Surgery  [address] [place] [address]  Phone: [phone]  Fax: [phone]

## 2018-10-19 NOTE — DISCHARGE NOTE ADULT - MEDICATION SUMMARY - MEDICATIONS TO TAKE
I will START or STAY ON the medications listed below when I get home from the hospital:    aspirin 325 mg oral tablet  -- 1 tab(s) by mouth once a day  -- Indication: For antiplatelet    atorvastatin 40 mg oral tablet  -- 1 tab(s) by mouth once a day  -- Indication: For HLD    levothyroxine 75 mcg (0.075 mg) oral tablet  -- 1 tab(s) by mouth once a day  -- Indication: For hypothyroidism    Multiple Vitamins oral capsule  -- 1 cap(s) by mouth once a day  -- Indication: For multivitamin supplement I will START or STAY ON the medications listed below when I get home from the hospital:    aspirin 325 mg oral tablet  -- 1 tab(s) by mouth once a day  -- Indication: For PVD (peripheral vascular disease)    atorvastatin 40 mg oral tablet  -- 1 tab(s) by mouth once a day  -- Indication: For PVD (peripheral vascular disease)    levothyroxine 75 mcg (0.075 mg) oral tablet  -- 1 tab(s) by mouth once a day  -- Indication: For hypothyroidism    Multiple Vitamins oral capsule  -- 1 cap(s) by mouth once a day  -- Indication: For multivitamin I will START or STAY ON the medications listed below when I get home from the hospital:    aspirin 325 mg oral tablet  -- 1 tab(s) by mouth once a day  -- Indication: For PVD (peripheral vascular disease)    Percocet 5/325 oral tablet  -- 1 tab(s) by mouth every 6 hours MDD:4 tabs  -- Caution federal law prohibits the transfer of this drug to any person other  than the person for whom it was prescribed.  May cause drowsiness.  Alcohol may intensify this effect.  Use care when operating dangerous machinery.  This prescription cannot be refilled.  This product contains acetaminophen.  Do not use  with any other product containing acetaminophen to prevent possible liver damage.  Using more of this medication than prescribed may cause serious breathing problems.    -- Indication: For Pain    atorvastatin 40 mg oral tablet  -- 1 tab(s) by mouth once a day  -- Indication: For PVD (peripheral vascular disease)    levothyroxine 75 mcg (0.075 mg) oral tablet  -- 1 tab(s) by mouth once a day  -- Indication: For hypothyroidism    Multiple Vitamins oral capsule  -- 1 cap(s) by mouth once a day  -- Indication: For multivitamin

## 2018-10-19 NOTE — PROGRESS NOTE ADULT - ATTENDING COMMENTS
s/p fem PT bypass    doing well  PT signal  OOB   Tx to floor
s/p fem-peroneal bypass    PT signal  OOB  Tx to floor
Assessment and plan above were modified and discussed with residents, physician assistants, and nurses.  I have provided 25 min of Critical Care to this patient.
s/p fem peroneal bypass    doing well  OOB  check h/h if stable tomorrow will plan for DC home

## 2018-10-19 NOTE — DISCHARGE NOTE ADULT - CARE PLAN
Principal Discharge DX:	PVD (peripheral vascular disease)  Goal:	complete recovery  Assessment and plan of treatment:	Home with PT, rolling walker today  Wound care: gentle soap and water, pat dry, no dressing necessary, pt educated  F/u w/ Dr. Tavarez in 2 wk

## 2018-10-19 NOTE — PROGRESS NOTE ADULT - SUBJECTIVE AND OBJECTIVE BOX
VASCULAR SURGERY PROGRESS NOTE    CC: left toe ulcer  Hospital Day # 3  Post-Op Day # 3    Procedure: s/p left femoral-peroneal vein bypass    Events of past 24 hours: walked with a walker with the help of PT. C/o minimal pain over the incision site.           12 point ROS otherwise negative except per subjective and HPI      PAST MEDICAL & SURGICAL HISTORY:  PVD (peripheral vascular disease)  Smoker: HX OF  Hypercholesteremia  Hypothyroidism  HTN (hypertension)  History of surgery: EGD      Vital Signs Last 24 Hrs  T(C): 36.5 (19 Oct 2018 07:07), Max: 36.9 (18 Oct 2018 23:10)  T(F): 97.7 (19 Oct 2018 07:07), Max: 98.5 (18 Oct 2018 23:10)  HR: 88 (19 Oct 2018 07:07) (88 - 103)  BP: 117/58 (19 Oct 2018 07:07) (111/55 - 124/66)  BP(mean): 89 (18 Oct 2018 15:35) (89 - 89)  RR: 18 (19 Oct 2018 07:07) (18 - 18)  SpO2: 98% (18 Oct 2018 15:35) (98% - 98%)    Pain (0-10):  3-4/10          Pain Control Adequate: [x] YES [] N    Diet: low cholesterol, low fat    I&O's Detail    18 Oct 2018 07:01  -  19 Oct 2018 07:00  --------------------------------------------------------  IN:    heparin Infusion: 30 mL    lactated ringers.: 800 mL    Oral Fluid: 960 mL  Total IN: 1790 mL    OUT:    Voided: 1000 mL  Total OUT: 1000 mL    Total NET: 790 mL          Bowel Movement: : [] YES [x] NO  Flatus: : [x] YES [] NO    PHYSICAL EXAM    Appearance: Normal	  HEENT:   Normal oral mucosa, PERRL, EOMI	  Neck: Supple, - JVD; Carotid Bruit   Cardiovascular: Normal S1 S2, No JVD, No murmurs,   Respiratory: Lungs clear to auscultation, No Rales, Rhonchi, Wheezing	  Gastrointestinal:  Soft, Non-tender, + BS	  Skin: No rashes, No ecchymoses, No cyanosis  Extremities: Normal range of motion, No clubbing, cyanosis or edema. + saphenous incision on the left lower extremity with minimal drainage, no bleeding/hematoma/oozing, dressing changed  Neurologic: Non-focal  Psychiatry: A & O x 3, Mood & affect appropriate    PULSES:  Femoral:  Popliteal:  Dorsal Pedal: dooplerable left  Posterior Tibial: dopplerable Left  Capillary:      MEDICATIONS:   MEDICATIONS  (STANDING):  aspirin 325 milliGRAM(s) Oral daily  atorvastatin 40 milliGRAM(s) Oral at bedtime  chlorhexidine 4% Liquid 1 Application(s) Topical <User Schedule>  docusate sodium 100 milliGRAM(s) Oral every 8 hours  lactated ringers. 1000 milliLiter(s) (100 mL/Hr) IV Continuous <Continuous>  levothyroxine 75 MICROGram(s) Oral daily  multivitamin 1 Tablet(s) Oral daily    MEDICATIONS  (PRN):  acetaminophen   Tablet .. 650 milliGRAM(s) Oral every 4 hours PRN Mild Pain (1 - 3)  morphine  - Injectable 2 milliGRAM(s) IV Push every 4 hours PRN severe breakthrough pain  oxyCODONE    IR 5 milliGRAM(s) Oral every 4 hours PRN Moderate Pain (4 - 6)  oxyCODONE    IR 10 milliGRAM(s) Oral every 4 hours PRN Severe Pain (7 - 10)      DVT PROPHYLAXIS: [] YES [x] NO  GI PROPHYLAXIS: [] YES [x] NO  ANTIPLATELETS: [x] YES [] NO  ANTICOAGULATION: [] YES [x] NO  ANTIBIOTICS: [] YES [x] NO    LAB/STUDIES:                        8.4    9.99  )-----------( 213      ( 19 Oct 2018 08:27 )             25.5     10-19    142  |  104  |  7<L>  ----------------------------<  99  4.4   |  29  |  0.7    Ca    7.9<L>      19 Oct 2018 08:27  Phos  2.2     10-18  Mg     2.0     10-19      PT/INR - ( 19 Oct 2018 08:27 )   PT: 12.40 sec;   INR: 1.08 ratio         PTT - ( 19 Oct 2018 08:27 )  PTT:28.3 sec        CARDIAC MARKERS ( 18 Oct 2018 01:37 )  x     / <0.01 ng/mL / 439 U/L / x     / 3.9 ng/mL  CARDIAC MARKERS ( 17 Oct 2018 17:36 )  x     / <0.01 ng/mL / 685 U/L / x     / 6.3 ng/mL  CARDIAC MARKERS ( 17 Oct 2018 12:07 )  x     / <0.01 ng/mL / 598 U/L / x     / 6.3 ng/mL

## 2018-10-19 NOTE — DISCHARGE NOTE ADULT - PLAN OF CARE
complete recovery Home with PT, rolling walker today  Wound care: gentle soap and water, pat dry, no dressing necessary, pt educated  F/u w/ Dr. Tavarez in 2 wk

## 2018-10-19 NOTE — DISCHARGE NOTE ADULT - PATIENT PORTAL LINK FT
You can access the [email or web address], offered by [facility], by registering with the following website: [email or web address]

## 2018-10-19 NOTE — PROGRESS NOTE ADULT - ASSESSMENT
77 M with PVD, non-healing left toe ulceration, underwent femoral to peroneal bypass using a vein, initially was placed on Heparin drip, however, due to profuse oozing from the incision site, heparin discontinued, acute blood loss anemia (Hgb 11->8.4)  - no signs of bleeding at this time  - follow up on CBC  - ambulate  - will d/w Dr. Tvaarez the role for full AC vs DVT prophylaxis    Dispo: anticipate home with PT, rolling walker when above issues resolve      SPECTRA [id number] 77 M with PVD, non-healing left toe ulceration, underwent femoral to peroneal bypass using a vein, initially was placed on Heparin drip, however, due to profuse oozing from the incision site, heparin discontinued, acute blood loss anemia (Hgb 11->8.4)  - no signs of bleeding at this time  - follow up on CBC  - ambulate  - started on DVT prophylaxis    Dispo: anticipate home with PT, rolling walker when above issues resolve      SPECTRA [id number]

## 2018-10-19 NOTE — PHYSICAL THERAPY INITIAL EVALUATION ADULT - DID THE PATIENT HAVE SURGERY?
yes/Atherosclerosis of native artery of left lower extremity with ulceration of other part of foot  10/16/2018

## 2018-10-19 NOTE — DISCHARGE NOTE ADULT - HOSPITAL COURSE
Pt admitted on 10/16 w/ L toe ulcer. CT angio abdomen at that time showed severe disease of the l superficial femoral artery w/ occlusion above the level of the abductor canal. Underwent L fem-peroneal bypass w/ RSVG on 10/16. Since has ambulated with PT, received dressing instructions above. Home PT arranged, rolling walker at bedside. Vitally stable.

## 2018-10-20 ENCOUNTER — INBOUND DOCUMENT (OUTPATIENT)
Age: 78
End: 2018-10-20

## 2018-10-20 VITALS
HEART RATE: 84 BPM | SYSTOLIC BLOOD PRESSURE: 130 MMHG | TEMPERATURE: 98 F | DIASTOLIC BLOOD PRESSURE: 62 MMHG | RESPIRATION RATE: 18 BRPM

## 2018-10-20 LAB
ANION GAP SERPL CALC-SCNC: 11 MMOL/L — SIGNIFICANT CHANGE UP (ref 7–14)
APTT BLD: 30 SEC — SIGNIFICANT CHANGE UP (ref 27–39.2)
BUN SERPL-MCNC: 8 MG/DL — LOW (ref 10–20)
CALCIUM SERPL-MCNC: 8.1 MG/DL — LOW (ref 8.5–10.1)
CHLORIDE SERPL-SCNC: 104 MMOL/L — SIGNIFICANT CHANGE UP (ref 98–110)
CO2 SERPL-SCNC: 27 MMOL/L — SIGNIFICANT CHANGE UP (ref 17–32)
CREAT SERPL-MCNC: 0.7 MG/DL — SIGNIFICANT CHANGE UP (ref 0.7–1.5)
GLUCOSE SERPL-MCNC: 102 MG/DL — HIGH (ref 70–99)
HCT VFR BLD CALC: 26.4 % — LOW (ref 42–52)
HGB BLD-MCNC: 8.6 G/DL — LOW (ref 14–18)
INR BLD: 1.11 RATIO — SIGNIFICANT CHANGE UP (ref 0.65–1.3)
MAGNESIUM SERPL-MCNC: 2 MG/DL — SIGNIFICANT CHANGE UP (ref 1.8–2.4)
MCHC RBC-ENTMCNC: 29.9 PG — SIGNIFICANT CHANGE UP (ref 27–31)
MCHC RBC-ENTMCNC: 32.6 G/DL — SIGNIFICANT CHANGE UP (ref 32–37)
MCV RBC AUTO: 91.7 FL — SIGNIFICANT CHANGE UP (ref 80–94)
NRBC # BLD: 0 /100 WBCS — SIGNIFICANT CHANGE UP (ref 0–0)
PLATELET # BLD AUTO: 246 K/UL — SIGNIFICANT CHANGE UP (ref 130–400)
POTASSIUM SERPL-MCNC: 4.2 MMOL/L — SIGNIFICANT CHANGE UP (ref 3.5–5)
POTASSIUM SERPL-SCNC: 4.2 MMOL/L — SIGNIFICANT CHANGE UP (ref 3.5–5)
PROTHROM AB SERPL-ACNC: 12.8 SEC — SIGNIFICANT CHANGE UP (ref 9.95–12.87)
RBC # BLD: 2.88 M/UL — LOW (ref 4.7–6.1)
RBC # FLD: 13.3 % — SIGNIFICANT CHANGE UP (ref 11.5–14.5)
SODIUM SERPL-SCNC: 142 MMOL/L — SIGNIFICANT CHANGE UP (ref 135–146)
WBC # BLD: 9.54 K/UL — SIGNIFICANT CHANGE UP (ref 4.8–10.8)
WBC # FLD AUTO: 9.54 K/UL — SIGNIFICANT CHANGE UP (ref 4.8–10.8)

## 2018-10-20 RX ADMIN — Medication 325 MILLIGRAM(S): at 11:39

## 2018-10-20 RX ADMIN — Medication 100 MILLIGRAM(S): at 14:16

## 2018-10-20 RX ADMIN — Medication 75 MICROGRAM(S): at 05:01

## 2018-10-20 RX ADMIN — Medication 1 TABLET(S): at 11:39

## 2018-10-20 NOTE — PROGRESS NOTE ADULT - SUBJECTIVE AND OBJECTIVE BOX
VICKI MAHER  77y Male   [id number]    Procedure/dx: left fem-peroneal bypass with rsvg  Events of the Last 24h:  Hemoglobin dropped fro 9.5 to 8.4, patient had bleeding from the wound which stopped     PAST MEDICAL & SURGICAL HISTORY:  PVD (peripheral vascular disease)  Smoker: HX OF  Hypercholesteremia  Hypothyroidism  HTN (hypertension)  History of surgery: EGD    Vital Signs Last 24 Hrs  T(C): 36.6 (19 Oct 2018 23:16), Max: 36.6 (19 Oct 2018 23:16)  T(F): 97.8 (19 Oct 2018 23:16), Max: 97.8 (19 Oct 2018 23:16)  HR: 93 (19 Oct 2018 23:16) (88 - 100)  BP: 127/60 (19 Oct 2018 23:16) (117/58 - 140/63)  RR: 18 (19 Oct 2018 23:16) (18 - 18)  SpO2: 96% (19 Oct 2018 14:39) (96% - 96%)    Diet, DASH/TLC:   Sodium & Cholesterol Restricted (10-16-18 @ 21:47)    I&O's Detail    18 Oct 2018 07:01  -  19 Oct 2018 07:00  --------------------------------------------------------  IN:    heparin Infusion: 30 mL    lactated ringers.: 800 mL    Oral Fluid: 960 mL  Total IN: 1790 mL    OUT:    Voided: 1000 mL  Total OUT: 1000 mL    Total NET: 790 mL      19 Oct 2018 07:01  -  20 Oct 2018 00:59  --------------------------------------------------------  IN:    Oral Fluid: 720 mL  Total IN: 720 mL    OUT:    Voided: 1400 mL  Total OUT: 1400 mL    Total NET: -680 mL    MEDICATIONS  (STANDING):  aspirin 325 milliGRAM(s) Oral daily  atorvastatin 40 milliGRAM(s) Oral at bedtime  chlorhexidine 4% Liquid 1 Application(s) Topical <User Schedule>  docusate sodium 100 milliGRAM(s) Oral every 8 hours  enoxaparin Injectable 40 milliGRAM(s) SubCutaneous every 24 hours  levothyroxine 75 MICROGram(s) Oral daily  multivitamin 1 Tablet(s) Oral daily    MEDICATIONS  (PRN):  acetaminophen   Tablet .. 650 milliGRAM(s) Oral every 4 hours PRN Mild Pain (1 - 3)  morphine  - Injectable 2 milliGRAM(s) IV Push every 4 hours PRN severe breakthrough pain  oxyCODONE    IR 5 milliGRAM(s) Oral every 4 hours PRN Moderate Pain (4 - 6)  oxyCODONE    IR 10 milliGRAM(s) Oral every 4 hours PRN Severe Pain (7 - 10)    PHYSICAL EXAM:  GENERAL: NAD,   EYES: conjunctiva and sclera clear  NECK:  Supple. No JVD.   CHEST/LUNG: Clear to auscultation bilaterally;   HEART: Regular rate and rhythm;   ABDOMEN: Soft, Nontender, Nondistended; Bowel sounds present  EXTREMITIES:  DP/PT dopplerable in b/l lower extremities  PSYCH: AAOx3  INCISION/WOUNDS: No bleeding on the wound dressing. surrounding area with no swelling or erythema       LABS:                   8.4    9.99  )-----------( 213      ( 19 Oct 2018 08:27 )             25.5        10-19    142  |  104  |  7<L>  ----------------------------<  99  4.4   |  29  |  0.7    Ca    7.9<L>      19 Oct 2018 08:27  Phos  2.2     10-18  Mg     2.0         PT/INR - ( 19 Oct 2018 08:27 )   PT: 12.40 sec;   INR: 1.08 ratio      PTT - ( 19 Oct 2018 08:27 )  PTT:28.3 sec  CARDIAC MARKERS ( 18 Oct 2018 01:37 )  x     / <0.01 ng/mL / 439 U/L / x     / 3.9 ng/mL

## 2018-10-20 NOTE — PROGRESS NOTE ADULT - ASSESSMENT
76 y/o Male with PVD, non-healing left toe ulceration, underwent femoral to peroneal bypass using a vein, initially was placed on Heparin drip, however, due to profuse oozing from the incision site, heparin discontinued, acute blood loss anemia (Hgb 11->8.4)  - no signs of bleeding at this time  - follow up AM CBC  - ambulate  - discharge planning: if h/h stable will plan for dc home with PT, rolling walker today 78 y/o Male with PVD, non-healing left toe ulceration, underwent femoral to peroneal bypass using a vein, initially was placed on Heparin drip, however, due to profuse oozing from the incision site, heparin discontinued, acute blood loss anemia (Hgb 11->8.4)  - no signs of bleeding at this time  - follow up AM CBC  - ambulate  - discharge planning: if h/h stable will plan for dc home with PT, rolling walker today  - wound care: gentle soap and water, pat dry, no dressing necessary, pt educated  - f/u w/ Dr. Tavarez in 2 wk

## 2018-10-26 DIAGNOSIS — Z91.81 HISTORY OF FALLING: ICD-10-CM

## 2018-10-26 DIAGNOSIS — D62 ACUTE POSTHEMORRHAGIC ANEMIA: ICD-10-CM

## 2018-10-26 DIAGNOSIS — Z87.891 PERSONAL HISTORY OF NICOTINE DEPENDENCE: ICD-10-CM

## 2018-10-26 DIAGNOSIS — I70.245 ATHEROSCLEROSIS OF NATIVE ARTERIES OF LEFT LEG WITH ULCERATION OF OTHER PART OF FOOT: ICD-10-CM

## 2018-10-26 DIAGNOSIS — I77.1 STRICTURE OF ARTERY: ICD-10-CM

## 2018-10-26 DIAGNOSIS — E78.00 PURE HYPERCHOLESTEROLEMIA, UNSPECIFIED: ICD-10-CM

## 2018-10-26 DIAGNOSIS — E03.9 HYPOTHYROIDISM, UNSPECIFIED: ICD-10-CM

## 2018-10-26 DIAGNOSIS — L97.529 NON-PRESSURE CHRONIC ULCER OF OTHER PART OF LEFT FOOT WITH UNSPECIFIED SEVERITY: ICD-10-CM

## 2018-10-26 DIAGNOSIS — I10 ESSENTIAL (PRIMARY) HYPERTENSION: ICD-10-CM

## 2018-11-01 ENCOUNTER — APPOINTMENT (OUTPATIENT)
Dept: VASCULAR SURGERY | Facility: CLINIC | Age: 78
End: 2018-11-01
Payer: MEDICARE

## 2018-11-01 PROCEDURE — 99024 POSTOP FOLLOW-UP VISIT: CPT

## 2018-11-01 RX ORDER — CLOPIDOGREL BISULFATE 75 MG/1
75 TABLET, FILM COATED ORAL DAILY
Qty: 90 | Refills: 1 | Status: ACTIVE | COMMUNITY
Start: 2018-11-01 | End: 1900-01-01

## 2018-12-13 ENCOUNTER — APPOINTMENT (OUTPATIENT)
Dept: VASCULAR SURGERY | Facility: CLINIC | Age: 78
End: 2018-12-13
Payer: MEDICARE

## 2018-12-13 DIAGNOSIS — I73.9 PERIPHERAL VASCULAR DISEASE, UNSPECIFIED: ICD-10-CM

## 2018-12-13 PROCEDURE — 93926 LOWER EXTREMITY STUDY: CPT | Mod: LT

## 2018-12-13 PROCEDURE — 99024 POSTOP FOLLOW-UP VISIT: CPT

## 2019-01-22 RX ORDER — CLOPIDOGREL BISULFATE 75 MG/1
75 TABLET, FILM COATED ORAL DAILY
Qty: 90 | Refills: 3 | Status: ACTIVE | COMMUNITY
Start: 2019-01-22 | End: 1900-01-01

## 2019-06-10 ENCOUNTER — APPOINTMENT (OUTPATIENT)
Dept: VASCULAR SURGERY | Facility: CLINIC | Age: 79
End: 2019-06-10
Payer: MEDICARE

## 2019-06-10 VITALS — SYSTOLIC BLOOD PRESSURE: 120 MMHG | DIASTOLIC BLOOD PRESSURE: 80 MMHG

## 2019-06-10 PROCEDURE — 99213 OFFICE O/P EST LOW 20 MIN: CPT

## 2019-06-10 PROCEDURE — 93926 LOWER EXTREMITY STUDY: CPT

## 2019-06-10 NOTE — ASSESSMENT
[FreeTextEntry1] : Mr. Stinson is a 78 year-old man with a history of peripheral vascular disease with a nonhealing wound to his left 1st toe and underwent left CFA endarterectomy and femoral to peroneal reverse saphenous vein bypass graft on 10/16/18. His left foot wound has healed well and the left lower extremity bypass is patent on duplex today. His walking is improved. He should stay on Aspirin and Plavix. I will see him back in 6 months for follow up.

## 2019-06-10 NOTE — DATA REVIEWED
[FreeTextEntry1] : I performed an arterial duplex which was medically necessary to evaluate for patency of bypass graft. It shows a patent bypass with no evidence of stenosis.

## 2019-06-10 NOTE — HISTORY OF PRESENT ILLNESS
[FreeTextEntry1] : Mr. Stinson is a 78 year-old man with a history of peripheral vascular disease with a nonhealing wound to his left 1st toe and underwent left CFA endarterectomy and femoral to peroneal reverse saphenous vein bypass graft on 10/16/18. He states that the left first toe wound is healed. He presents today to follow up. His walking is much improved since the surgery.  [de-identified] : The patient is s/p left leg angiogram. The patient has significant common femoral disease as well as superficial femoral artery stenosis with tibial vessel runoff to the foot.

## 2019-09-10 NOTE — PATIENT PROFILE ADULT - SURGICAL SITE DESCRIPTION
9/10/2019         RE: Suyapa Monique  [address] [facility] [id number]        Dear Colleague,    Thank you for referring your patient, Suyapa Monique, to the [facility]. Please see a copy of my visit note below.    Suyapa Monique is a 6 year old year old male patient here today for rash on face and arms. Mother note rash on cheeks will come and go. Feel rough occassionally will look like pimples. She also notes what she initially thought was a skin tag on his neck but he continue to get more. Associated symptoms: none.  Patient has no other skin complaints today.  Remainder of the HPI, Meds, PMH, Allergies, FH, and SH was reviewed in chart.    No past medical history on file.    No past surgical history on file.     Family History   Problem Relation Age of Onset     Thyroid Disease Mother      Asthma Mother      Asthma Father        Social History     Socioeconomic History     Marital status: Single     Spouse name: Not on file     Number of children: Not on file     Years of education: Not on file     Highest education level: Not on file   Occupational History     Not on file   Social Needs     Financial resource strain: Not on file     Food insecurity:     Worry: Not on file     Inability: Not on file     Transportation needs:     Medical: Not on file     Non-medical: Not on file   Tobacco Use     Smoking status: Never Smoker     Smokeless tobacco: Never Used   Substance and Sexual Activity     Alcohol use: Not on file     Drug use: Not on file     Sexual activity: Not on file   Lifestyle     Physical activity:     Days per week: Not on file     Minutes per session: Not on file     Stress: Not on file   Relationships     Social connections:     Talks on phone: Not on file     Gets together: Not on file     Attends Congregation service: Not on file     Active member of club or organization: Not on file     Attends meetings of clubs or organizations: Not on file     Relationship status: Not on  file     Intimate partner violence:     Fear of current or ex partner: Not on file     Emotionally abused: Not on file     Physically abused: Not on file     Forced sexual activity: Not on file   Other Topics Concern     Not on file   Social History Narrative    ** Merged History Encounter **            Outpatient Encounter Medications as of 9/10/2019   Medication Sig Dispense Refill     acetaminophen (TYLENOL) 160 MG/5ML solution Take 15 mg/kg by mouth every 6 hours as needed for fever or mild pain       albuterol (2.5 MG/3ML) 0.083% neb solution Take 2.5 mg by nebulization every 4 hours as needed        budesonide (PULMICORT) 0.25 MG/2ML neb solution Take 0.25 mg by nebulization daily        butenafine (LOTRIMIN ULTRA) 1 % CREA cream Apply topically daily       cetirizine (ZYRTEC) 5 MG/5ML solution Take 5 mg by mouth every evening        omeprazole (FIRST) 2 MG/ML SUSP Take 5 ml by mouth two times daily       polyethylene glycol (MIRALAX/GLYCOLAX) powder Take 0.5 capfuls by mouth daily       No facility-administered encounter medications on file as of 9/10/2019.              Review Of Systems  Skin: As above  Eyes: negative  Ears/Nose/Throat: negative  Respiratory: No shortness of breath, dyspnea on exertion, cough, or hemoptysis  Cardiovascular: negative  Gastrointestinal: negative  Genitourinary: negative  Musculoskeletal: negative  Neurologic: negative  Psychiatric: negative  Hematologic/Lymphatic/Immunologic: negative  Endocrine: negative      O:   NAD, WDWN, Alert & Oriented, Mood & Affect wnl, Vitals stable   Here today alone   /74   Pulse 100    General appearance normal   Vitals stable   Alert, oriented and in no acute distress     Pink scaly perifollicular papules on cheeks and arms consistent with keratosis pilaris  Pink dome shaped papules with umbilicated centers on neck x 10       Eyes: Conjunctivae/lids:Normal     ENT: Lips: normal    MSK:Normal    Pulm: Breathing Normal    Neuro/Psych:  Orientation:Normal; Mood/Affect:[place]  A/P:  1. Keratosis Pilaris   Keratosis Pilaris:    Is a genetic rash that is considered common. Prevalent on the arms, upper legs, and    cheeks, it looks like small bumps. There is no cure, but there are some topical creams    that will help smooth out the bumps. Over the counter creams you can use: AmLactin or Gold bond rough and bumpy or Eucerin Plus daily to twice daily. Use gentle cleansers such as: Dove, Cetaphil, or    Vanicream.    2. Molluscum Contagiosum x 10 on neck   CANTHARIDIN (CANTHARONE) TREATMENT  FOR  MOLLUSCUM CONTAGIOSUM  Cantharone/Cantharidin is a blistering solution which causes  redness, swelling, and fluid accumulation under the molluscum  4-6 hours after treatment the area(s) should be washed carefully  with soap and water. DO NOT SCRUB the area(s) there should  be a film over the treated area(s) after washing  If severer stinging or burning occurs before the 4 hours it is ok  to wash the area sooner - Again DO NOT SCRUB the area(s)  there should be a film over the treated area(s)  Blistering with start to form in about 3 to 8 hours post  treatment  Some patient may be very sensitive to the Cantharone and may  experience tingling or burning sensation at the treatment site(s)  Tylenol/Advil may be taken for discomfort.  In 1-2 weeks crusting and peeling occurs- Vaseline may be  applied (using a Q-tip) to help the healing process.  If the area(s) become very red, painful to touch, and/or looks  infected contact the office to speak to your  Provider    Multiple treatments may be needed to treat molluscum.    Again, thank you for allowing me to participate in the care of your patient.        Sincerely,        Charmaine Pathak PA-C     L upper to lower leg, L groin site

## 2019-12-09 ENCOUNTER — APPOINTMENT (OUTPATIENT)
Dept: VASCULAR SURGERY | Facility: CLINIC | Age: 79
End: 2019-12-09
Payer: MEDICARE

## 2019-12-09 DIAGNOSIS — I87.2 VENOUS INSUFFICIENCY (CHRONIC) (PERIPHERAL): ICD-10-CM

## 2019-12-09 PROCEDURE — 99213 OFFICE O/P EST LOW 20 MIN: CPT

## 2019-12-09 PROCEDURE — 93926 LOWER EXTREMITY STUDY: CPT

## 2019-12-09 RX ORDER — BETAMETHASONE DIPROPIONATE 0.5 MG/G
0.05 OINTMENT TOPICAL DAILY
Qty: 1 | Refills: 3 | Status: ACTIVE | COMMUNITY
Start: 2019-12-09 | End: 1900-01-01

## 2019-12-09 NOTE — ASSESSMENT
[FreeTextEntry1] : Mr. Stinson is a 79 year-old man with a history of peripheral vascular disease with a nonhealing wound to his left 1st toe and underwent left CFA endarterectomy and femoral to peroneal reverse saphenous vein bypass graft on 10/16/18. He states that the left first toe wound is healed. He presents today to follow up. His walking is much improved since the surgery. \par I performed an arterial duplex which was medically necessary to evaluate for patency of bypass graft. It shows a patent bypass with no evidence of stenosis. \par I am prescribing him steroid cream to be applied daily for venous stasis dermatitis. I will see him back in six months time.

## 2019-12-09 NOTE — HISTORY OF PRESENT ILLNESS
[FreeTextEntry1] : Mr. Stinson is a 79 year-old man with a history of peripheral vascular disease with a nonhealing wound to his left 1st toe and underwent left CFA endarterectomy and femoral to peroneal reverse saphenous vein bypass graft on 10/16/18. He states that the left first toe wound is healed. He presents today to follow up. His walking is much improved since the surgery.

## 2020-03-09 ENCOUNTER — APPOINTMENT (OUTPATIENT)
Dept: VASCULAR SURGERY | Facility: CLINIC | Age: 80
End: 2020-03-09
Payer: MEDICARE

## 2020-03-09 VITALS — SYSTOLIC BLOOD PRESSURE: 128 MMHG | DIASTOLIC BLOOD PRESSURE: 74 MMHG

## 2020-03-09 PROCEDURE — 99213 OFFICE O/P EST LOW 20 MIN: CPT

## 2020-03-09 PROCEDURE — 93926 LOWER EXTREMITY STUDY: CPT

## 2020-03-09 NOTE — HISTORY OF PRESENT ILLNESS
[FreeTextEntry1] : Mr. Stinson is a 79 year-old man with a history of peripheral vascular disease with a nonhealing wound to his left 1st toe and underwent left CFA endarterectomy and femoral to peroneal reverse saphenous vein bypass graft on 10/16/18. He states that the left first toe wound is healed. He presents today to follow up. His walking is much improved since the surgery. \par \par the patient informed me of a recent GI bleed and he was anemic. he had an upper GI and noted to have a bleeding ulcer.  His ASA and  plavix was held for 3 weeks and now has been restarted

## 2020-03-09 NOTE — DATA REVIEWED
[FreeTextEntry1] : I performed an arterial duplex which was medically necessary to evaluate for patency of bypass graft. It shows a patent bypass with no evidence of stenosis.  the cfa endarterectomy site is patent with some dilatation of the lumen at 1.6 cm

## 2020-09-14 ENCOUNTER — APPOINTMENT (OUTPATIENT)
Dept: VASCULAR SURGERY | Facility: CLINIC | Age: 80
End: 2020-09-14

## 2020-10-08 ENCOUNTER — APPOINTMENT (OUTPATIENT)
Dept: VASCULAR SURGERY | Facility: CLINIC | Age: 80
End: 2020-10-08
Payer: MEDICARE

## 2020-10-08 PROCEDURE — 93926 LOWER EXTREMITY STUDY: CPT

## 2020-10-08 PROCEDURE — 99213 OFFICE O/P EST LOW 20 MIN: CPT

## 2020-10-08 NOTE — ASSESSMENT
[FreeTextEntry1] : Mr. Stinson is a 79 year-old man with a history of peripheral vascular disease with a nonhealing wound to his left 1st toe and underwent left CFA endarterectomy and femoral to peroneal reverse saphenous vein bypass graft on 10/16/18. He states that the left first toe wound is healed. He presents today to follow up. His walking is much improved since the surgery. \par I performed an arterial duplex which was medically necessary to evaluate for patency of bypass graft. It shows a patent bypass with no evidence of stenosis. \par The redness may be stasis changes vs. venous congestion. there is no evidence of ischemia. He will see dermatology and follow with me in 6 months.

## 2020-10-08 NOTE — DATA REVIEWED
[FreeTextEntry1] : The FEM-PER Vein bypass graft is patent with no evidence of hemodynamically significant disease.Both anastamosis are within normal limits. The inflow common femoral artery endarterectomy site is patent with no evidence of restenosis. The out flow peroneal artery is patent with moderately diminished flow.

## 2020-10-08 NOTE — HISTORY OF PRESENT ILLNESS
[FreeTextEntry1] : Mr. Stinson is a 79 year-old man with a history of peripheral vascular disease with a nonhealing wound to his left 1st toe and underwent left CFA endarterectomy and femoral to peroneal reverse saphenous vein bypass graft on 10/16/18. He states that the left first toe wound is healed. He presents today to follow up. His walking is much improved since the surgery. \par \par the patient informed me of a recent GI bleed and he was anemic. he had an upper GI and noted to have a bleeding ulcer.  His ASA and  plavix was held for 3 weeks and now has been restarted. [de-identified] : He presents today with complaint of leg redness. He denies ulceration or pain.

## 2020-12-16 PROBLEM — Z01.810 ENCOUNTER FOR PREOPERATIVE VASCULAR EXAMINATION: Status: RESOLVED | Noted: 2018-10-01 | Resolved: 2020-12-16

## 2021-04-08 ENCOUNTER — APPOINTMENT (OUTPATIENT)
Dept: VASCULAR SURGERY | Facility: CLINIC | Age: 81
End: 2021-04-08
Payer: MEDICARE

## 2021-04-08 DIAGNOSIS — I70.392: ICD-10-CM

## 2021-04-08 PROCEDURE — 93926 LOWER EXTREMITY STUDY: CPT

## 2021-04-08 PROCEDURE — 99213 OFFICE O/P EST LOW 20 MIN: CPT

## 2021-04-08 NOTE — HISTORY OF PRESENT ILLNESS
[FreeTextEntry1] : Mr. Stinson is a 79 year-old man with a history of peripheral vascular disease with a nonhealing wound to his left 1st toe and underwent left CFA endarterectomy and femoral to peroneal reverse saphenous vein bypass graft on 10/16/18. He states that the left first toe wound is healed. He presents today to follow up. he states his left leg is heavy when he walks.\par \par   His  is currently ASA and  plavix  [de-identified] : He presents today with complaint of leg redness. He denies ulceration or pain.

## 2021-04-08 NOTE — DATA REVIEWED
[FreeTextEntry1] : The FEM-PER Vein bypass graft is patent with diminished flow in the mid to diatal segment. Both anastomosis are within normal limits. The inflow common femoral artery endarterectomy site is patent with no evidence of restenosis some dilation at 1.6 cm  The out flow peroneal artery is patent with severely  diminished flow.

## 2021-04-08 NOTE — ASSESSMENT
[FreeTextEntry1] : Mr. Stinson is a 79 year-old man with a history of peripheral vascular disease with a nonhealing wound to his left 1st toe and underwent left CFA endarterectomy and femoral to peroneal reverse saphenous vein bypass graft on 10/16/18. He states that the left first toe wound is healed. He presents today to follow up. His walking is much improved since the surgery. \par I performed an arterial duplex which was medically necessary to evaluate for patency of bypass graft. It shows a patent bypass with decrease flow suggesting a failing bypass\par I would like to schedule the patient for a left leg angiogram to preserve bypass patency. \par The redness may be stasis changes vs. venous congestion. there is no evidence of ischemia\par \par PLAN\par left leg angiogram\par

## 2021-04-28 ENCOUNTER — OUTPATIENT (OUTPATIENT)
Dept: OUTPATIENT SERVICES | Facility: HOSPITAL | Age: 81
LOS: 1 days | Discharge: HOME | End: 2021-04-28
Payer: MEDICARE

## 2021-04-28 VITALS
OXYGEN SATURATION: 92 % | DIASTOLIC BLOOD PRESSURE: 76 MMHG | HEIGHT: 75 IN | WEIGHT: 231.93 LBS | TEMPERATURE: 98 F | RESPIRATION RATE: 16 BRPM | HEART RATE: 76 BPM | SYSTOLIC BLOOD PRESSURE: 140 MMHG

## 2021-04-28 DIAGNOSIS — Z98.890 OTHER SPECIFIED POSTPROCEDURAL STATES: Chronic | ICD-10-CM

## 2021-04-28 DIAGNOSIS — I73.9 PERIPHERAL VASCULAR DISEASE, UNSPECIFIED: ICD-10-CM

## 2021-04-28 DIAGNOSIS — Z01.818 ENCOUNTER FOR OTHER PREPROCEDURAL EXAMINATION: ICD-10-CM

## 2021-04-28 LAB
ALBUMIN SERPL ELPH-MCNC: 4.6 G/DL — SIGNIFICANT CHANGE UP (ref 3.5–5.2)
ALP SERPL-CCNC: 91 U/L — SIGNIFICANT CHANGE UP (ref 30–115)
ALT FLD-CCNC: 17 U/L — SIGNIFICANT CHANGE UP (ref 0–41)
ANION GAP SERPL CALC-SCNC: 11 MMOL/L — SIGNIFICANT CHANGE UP (ref 7–14)
APTT BLD: 36.3 SEC — SIGNIFICANT CHANGE UP (ref 27–39.2)
AST SERPL-CCNC: 24 U/L — SIGNIFICANT CHANGE UP (ref 0–41)
BASOPHILS # BLD AUTO: 0.05 K/UL — SIGNIFICANT CHANGE UP (ref 0–0.2)
BASOPHILS NFR BLD AUTO: 0.5 % — SIGNIFICANT CHANGE UP (ref 0–1)
BILIRUB SERPL-MCNC: 0.3 MG/DL — SIGNIFICANT CHANGE UP (ref 0.2–1.2)
BUN SERPL-MCNC: 15 MG/DL — SIGNIFICANT CHANGE UP (ref 10–20)
CALCIUM SERPL-MCNC: 9.3 MG/DL — SIGNIFICANT CHANGE UP (ref 8.5–10.1)
CHLORIDE SERPL-SCNC: 104 MMOL/L — SIGNIFICANT CHANGE UP (ref 98–110)
CO2 SERPL-SCNC: 26 MMOL/L — SIGNIFICANT CHANGE UP (ref 17–32)
CREAT SERPL-MCNC: 0.9 MG/DL — SIGNIFICANT CHANGE UP (ref 0.7–1.5)
EOSINOPHIL # BLD AUTO: 0.13 K/UL — SIGNIFICANT CHANGE UP (ref 0–0.7)
EOSINOPHIL NFR BLD AUTO: 1.4 % — SIGNIFICANT CHANGE UP (ref 0–8)
GLUCOSE SERPL-MCNC: 109 MG/DL — HIGH (ref 70–99)
HCT VFR BLD CALC: 44 % — SIGNIFICANT CHANGE UP (ref 42–52)
HGB BLD-MCNC: 14.1 G/DL — SIGNIFICANT CHANGE UP (ref 14–18)
IMM GRANULOCYTES NFR BLD AUTO: 0.4 % — HIGH (ref 0.1–0.3)
INR BLD: 0.95 RATIO — SIGNIFICANT CHANGE UP (ref 0.65–1.3)
LYMPHOCYTES # BLD AUTO: 0.73 K/UL — LOW (ref 1.2–3.4)
LYMPHOCYTES # BLD AUTO: 7.9 % — LOW (ref 20.5–51.1)
MCHC RBC-ENTMCNC: 28.3 PG — SIGNIFICANT CHANGE UP (ref 27–31)
MCHC RBC-ENTMCNC: 32 G/DL — SIGNIFICANT CHANGE UP (ref 32–37)
MCV RBC AUTO: 88.2 FL — SIGNIFICANT CHANGE UP (ref 80–94)
MONOCYTES # BLD AUTO: 0.84 K/UL — HIGH (ref 0.1–0.6)
MONOCYTES NFR BLD AUTO: 9.1 % — SIGNIFICANT CHANGE UP (ref 1.7–9.3)
NEUTROPHILS # BLD AUTO: 7.4 K/UL — HIGH (ref 1.4–6.5)
NEUTROPHILS NFR BLD AUTO: 80.7 % — HIGH (ref 42.2–75.2)
NRBC # BLD: 0 /100 WBCS — SIGNIFICANT CHANGE UP (ref 0–0)
PLATELET # BLD AUTO: 254 K/UL — SIGNIFICANT CHANGE UP (ref 130–400)
POTASSIUM SERPL-MCNC: 4.4 MMOL/L — SIGNIFICANT CHANGE UP (ref 3.5–5)
POTASSIUM SERPL-SCNC: 4.4 MMOL/L — SIGNIFICANT CHANGE UP (ref 3.5–5)
PROT SERPL-MCNC: 6.8 G/DL — SIGNIFICANT CHANGE UP (ref 6–8)
PROTHROM AB SERPL-ACNC: 10.9 SEC — SIGNIFICANT CHANGE UP (ref 9.95–12.87)
RBC # BLD: 4.99 M/UL — SIGNIFICANT CHANGE UP (ref 4.7–6.1)
RBC # FLD: 13.2 % — SIGNIFICANT CHANGE UP (ref 11.5–14.5)
SODIUM SERPL-SCNC: 141 MMOL/L — SIGNIFICANT CHANGE UP (ref 135–146)
WBC # BLD: 9.19 K/UL — SIGNIFICANT CHANGE UP (ref 4.8–10.8)
WBC # FLD AUTO: 9.19 K/UL — SIGNIFICANT CHANGE UP (ref 4.8–10.8)

## 2021-04-28 PROCEDURE — 93010 ELECTROCARDIOGRAM REPORT: CPT

## 2021-04-28 RX ORDER — ASPIRIN/CALCIUM CARB/MAGNESIUM 324 MG
1 TABLET ORAL
Qty: 0 | Refills: 0 | DISCHARGE

## 2021-04-28 RX ORDER — LEVOTHYROXINE SODIUM 125 MCG
1 TABLET ORAL
Qty: 0 | Refills: 0 | DISCHARGE

## 2021-04-28 NOTE — H&P PST ADULT - HISTORY OF PRESENT ILLNESS
80yr old male under care of Vascular surgery- states "the Dr was not able to find the pulse on my LT foot-so they want to go in and find out and see if they can open up the circulation, S/T Fem -tibial bypass LT -2018. Denies any ulcers on the feet. Received COVID vaccines -2 doses. Is unable to walk more than 50 feet "the oxygen sat goes down to low 80s"- Will get pre-op card and pulm evals. Verbalized understanding of COVID prevetnion measures.  Anesthesia Alert  NO--Difficult Airway  NO--History of neck surgery or radiation  NO--Limited ROM of neck  NO--History of Malignant hyperthermia  NO--Personal or family history of Pseudocholinesterase deficiency  NO--Prior Anesthesia Complication  NO--Latex Allergy  NO--Loose teeth  NO--History of Rheumatoid Arthritis  NO--CESIA  NO--Other_____

## 2021-04-28 NOTE — H&P PST ADULT - NSICDXPASTMEDICALHX_GEN_ALL_CORE_FT
PAST MEDICAL HISTORY:  COPD (chronic obstructive pulmonary disease) awaiting home -02    [place] (hard of hearing) B/L hearing copdaids    HTN (hypertension)     Hypercholesteremia     Hypothyroidism     Peptic ulcer     PVD (peripheral vascular disease)     Smoker HX OF

## 2021-05-02 ENCOUNTER — OUTPATIENT (OUTPATIENT)
Dept: OUTPATIENT SERVICES | Facility: HOSPITAL | Age: 81
LOS: 1 days | Discharge: HOME | End: 2021-05-02

## 2021-05-02 ENCOUNTER — LABORATORY RESULT (OUTPATIENT)
Age: 81
End: 2021-05-02

## 2021-05-02 DIAGNOSIS — Z11.59 ENCOUNTER FOR SCREENING FOR OTHER VIRAL DISEASES: ICD-10-CM

## 2021-05-02 DIAGNOSIS — Z98.890 OTHER SPECIFIED POSTPROCEDURAL STATES: Chronic | ICD-10-CM

## 2021-05-02 PROBLEM — K27.9 PEPTIC ULCER, SITE UNSPECIFIED, UNSPECIFIED AS ACUTE OR CHRONIC, WITHOUT HEMORRHAGE OR PERFORATION: Chronic | Status: ACTIVE | Noted: 2021-04-28

## 2021-05-02 PROBLEM — J44.9 CHRONIC OBSTRUCTIVE PULMONARY DISEASE, UNSPECIFIED: Chronic | Status: ACTIVE | Noted: 2021-04-28

## 2021-05-02 PROBLEM — H91.90 UNSPECIFIED HEARING LOSS, UNSPECIFIED EAR: Chronic | Status: ACTIVE | Noted: 2021-04-28

## 2021-05-05 ENCOUNTER — OUTPATIENT (OUTPATIENT)
Dept: OUTPATIENT SERVICES | Facility: HOSPITAL | Age: 81
LOS: 1 days | Discharge: HOME | End: 2021-05-05
Payer: MEDICARE

## 2021-05-05 ENCOUNTER — APPOINTMENT (OUTPATIENT)
Dept: VASCULAR SURGERY | Facility: HOSPITAL | Age: 81
End: 2021-05-05

## 2021-05-05 VITALS
HEART RATE: 83 BPM | DIASTOLIC BLOOD PRESSURE: 63 MMHG | SYSTOLIC BLOOD PRESSURE: 130 MMHG | TEMPERATURE: 97 F | OXYGEN SATURATION: 93 % | HEIGHT: 75 IN | WEIGHT: 232.59 LBS | RESPIRATION RATE: 18 BRPM

## 2021-05-05 VITALS
OXYGEN SATURATION: 93 % | HEART RATE: 90 BPM | RESPIRATION RATE: 22 BRPM | DIASTOLIC BLOOD PRESSURE: 67 MMHG | SYSTOLIC BLOOD PRESSURE: 139 MMHG

## 2021-05-05 DIAGNOSIS — Z98.890 OTHER SPECIFIED POSTPROCEDURAL STATES: Chronic | ICD-10-CM

## 2021-05-05 PROCEDURE — 75710 ARTERY X-RAYS ARM/LEG: CPT | Mod: 26,59

## 2021-05-05 PROCEDURE — 37233: CPT | Mod: LT

## 2021-05-05 PROCEDURE — 75625 CONTRAST EXAM ABDOMINL AORTA: CPT | Mod: 26

## 2021-05-05 PROCEDURE — 37229: CPT | Mod: LT

## 2021-05-05 PROCEDURE — 76937 US GUIDE VASCULAR ACCESS: CPT | Mod: 26

## 2021-05-05 PROCEDURE — 37223: CPT | Mod: LT

## 2021-05-05 PROCEDURE — 36247 INS CATH ABD/L-EXT ART 3RD: CPT | Mod: 59

## 2021-05-05 RX ORDER — HYDROMORPHONE HYDROCHLORIDE 2 MG/ML
0.5 INJECTION INTRAMUSCULAR; INTRAVENOUS; SUBCUTANEOUS
Refills: 0 | Status: DISCONTINUED | OUTPATIENT
Start: 2021-05-05 | End: 2021-05-05

## 2021-05-05 RX ORDER — ONDANSETRON 8 MG/1
4 TABLET, FILM COATED ORAL ONCE
Refills: 0 | Status: DISCONTINUED | OUTPATIENT
Start: 2021-05-05 | End: 2021-05-05

## 2021-05-05 RX ORDER — ASPIRIN/CALCIUM CARB/MAGNESIUM 324 MG
81 TABLET ORAL ONCE
Refills: 0 | Status: COMPLETED | OUTPATIENT
Start: 2021-05-05 | End: 2021-05-05

## 2021-05-05 RX ORDER — FLUTICASONE FUROATE, UMECLIDINIUM BROMIDE AND VILANTEROL TRIFENATATE 200; 62.5; 25 UG/1; UG/1; UG/1
1 POWDER RESPIRATORY (INHALATION)
Qty: 0 | Refills: 0 | DISCHARGE

## 2021-05-05 RX ORDER — LEVOTHYROXINE SODIUM 125 MCG
1 TABLET ORAL
Qty: 0 | Refills: 0 | DISCHARGE

## 2021-05-05 RX ORDER — FAMOTIDINE 10 MG/ML
1 INJECTION INTRAVENOUS
Qty: 0 | Refills: 0 | DISCHARGE

## 2021-05-05 RX ORDER — SODIUM CHLORIDE 9 MG/ML
1000 INJECTION, SOLUTION INTRAVENOUS
Refills: 0 | Status: DISCONTINUED | OUTPATIENT
Start: 2021-05-05 | End: 2021-05-05

## 2021-05-05 RX ORDER — ATORVASTATIN CALCIUM 80 MG/1
1 TABLET, FILM COATED ORAL
Qty: 0 | Refills: 0 | DISCHARGE

## 2021-05-05 RX ORDER — CLOPIDOGREL BISULFATE 75 MG/1
1 TABLET, FILM COATED ORAL
Qty: 0 | Refills: 0 | DISCHARGE

## 2021-05-05 RX ADMIN — Medication 81 MILLIGRAM(S): at 09:56

## 2021-05-05 RX ADMIN — SODIUM CHLORIDE 75 MILLILITER(S): 9 INJECTION, SOLUTION INTRAVENOUS at 09:57

## 2021-05-05 NOTE — ASU PATIENT PROFILE, ADULT - PMH
COPD (chronic obstructive pulmonary disease)  awaiting home -02  [place] (hard of hearing)  B/L hearing copdaids  HTN (hypertension)    Hypercholesteremia    Hypothyroidism    Peptic ulcer    PVD (peripheral vascular disease)    Smoker  HX OF

## 2021-05-05 NOTE — CHART NOTE - NSCHARTNOTEFT_GEN_A_CORE
PACU ANESTHESIA ADMISSION NOTE      Procedure: Angioplasty  Post op diagnosis:      ____  Intubated  TV:______       Rate: ______      FiO2: ______    _x___  Patent Airway    _x___  Full return of protective reflexes    _x___  Full recovery from anesthesia / back to baseline status    Vitals  SPO2:-96% on 2l  HR:-77  RR:-14  B.P:-127/60  TEMP:-98.2    Mental Status:  _x___ Awake   ___x_ Alert   _____ Drowsy   _____ Sedated    Nausea/Vomiting:  _x___  NO       ______Yes,   See Post - Op Orders         Pain Scale (0-10):  __0___    Treatment: _x___ None    __x__ See Post - Op/PCA Orders    Post - Operative Fluids:   ___ Oral   ____x See Post - Op Orders    Plan: Discharge:   __x__Home       ____Floor     _____Critical Care    _____  Other:_________________    Comments:  Report endorsed to RN in pacu  Vitals stable  No anesthesia issues or complications noted.  Discharge to patient to home when criteria met.

## 2021-05-05 NOTE — BRIEF OPERATIVE NOTE - OPERATION/FINDINGS
left lower extremity angiogram, near complete occlusion of the infrapopliteal portion of the bypass graft. Atherectomy with balloon angioplasty performed. peroneal runoff.

## 2021-05-05 NOTE — ASU DISCHARGE PLAN (ADULT/PEDIATRIC) - CARE PROVIDER_API CALL
Anders Tavarez)  Surgery; Vascular Surgery  [address] [place] [address]  Phone: [phone]  Fax: [phone]  Follow Up Time: 2 weeks

## 2021-05-17 DIAGNOSIS — I70.222 ATHEROSCLEROSIS OF NATIVE ARTERIES OF EXTREMITIES WITH REST PAIN, LEFT LEG: ICD-10-CM

## 2021-05-17 DIAGNOSIS — J44.9 CHRONIC OBSTRUCTIVE PULMONARY DISEASE, UNSPECIFIED: ICD-10-CM

## 2021-05-17 DIAGNOSIS — E78.00 PURE HYPERCHOLESTEROLEMIA, UNSPECIFIED: ICD-10-CM

## 2021-05-17 DIAGNOSIS — Z87.891 PERSONAL HISTORY OF NICOTINE DEPENDENCE: ICD-10-CM

## 2021-05-17 DIAGNOSIS — I10 ESSENTIAL (PRIMARY) HYPERTENSION: ICD-10-CM

## 2021-05-25 ENCOUNTER — APPOINTMENT (OUTPATIENT)
Dept: VASCULAR SURGERY | Facility: CLINIC | Age: 81
End: 2021-05-25
Payer: MEDICARE

## 2021-05-25 VITALS
BODY MASS INDEX: 28.6 KG/M2 | SYSTOLIC BLOOD PRESSURE: 162 MMHG | TEMPERATURE: 97 F | HEIGHT: 75 IN | WEIGHT: 230 LBS | DIASTOLIC BLOOD PRESSURE: 72 MMHG

## 2021-05-25 DIAGNOSIS — I70.245 ATHEROSCLEROSIS OF NATIVE ARTERIES OF LEFT LEG WITH ULCERATION OF OTHER PART OF FOOT: ICD-10-CM

## 2021-05-25 PROCEDURE — 99213 OFFICE O/P EST LOW 20 MIN: CPT

## 2021-05-25 PROCEDURE — 93926 LOWER EXTREMITY STUDY: CPT

## 2021-05-25 NOTE — HISTORY OF PRESENT ILLNESS
[FreeTextEntry1] : Mr. Stinson is a 80 year-old man with a history of peripheral vascular disease with a nonhealing wound to his left 1st toe and underwent left CFA endarterectomy and femoral to peroneal reverse saphenous vein bypass graft on 10/16/18. He was found to have diminished flow in the bypass and underwent angioplasty of TPT and peroneal artery on 5/5/2021 for preserve bypass graft patency.\par He has known right SFA occlusion but denies any claudication or any lower extremity symptoms.\par

## 2021-05-25 NOTE — REASON FOR VISIT
[FreeTextEntry1] : \par the infrapopliteal portion of the bypass graft. Atherectomy with balloon\par angioplasty performed. peroneal runoff.\par on May 5th

## 2021-05-25 NOTE — DATA REVIEWED
[FreeTextEntry1] : I performed an arterial duplex which was medically necessary to evaluate for patency of the bypass graft. It showed patent left CFA endarterectomy site, femoral peroneal bypass with patent outflow vessel.\par

## 2021-05-25 NOTE — ASSESSMENT
[FreeTextEntry1] : Mr. Stinson is a 80 year-old man with a history of peripheral vascular disease and underwent left CFA endarterectomy and femoral to peroneal reverse saphenous vein bypass graft on 10/16/18. He underwent angioplasty of the left TPT and peroneal artery on 5/5/2021 to preserve bypass graft patency. He as known right SFA occlusion.\par \par The left lower extremity bypass is patent on duplex today with patent outflow.\par \par I have informed him of the test results and advised follow up in six months time.

## 2021-11-01 NOTE — PATIENT PROFILE ADULT - HOW PATIENT ADDRESSED, PROFILE
Bill Complex Repair And Graft Additional Text (Will Appearing After The Standard Complex Repair Text): The complex repair was not sufficient to completely close the primary defect. The remaining additional defect was repaired with the graft mentioned below.

## 2021-11-16 ENCOUNTER — APPOINTMENT (OUTPATIENT)
Dept: VASCULAR SURGERY | Facility: CLINIC | Age: 81
End: 2021-11-16

## 2022-09-09 NOTE — H&P PST ADULT - HEIGHT IN FEET
6 Prednisone Counseling:  I discussed with the patient the risks of prolonged use of prednisone including but not limited to weight gain, insomnia, osteoporosis, mood changes, diabetes, susceptibility to infection, glaucoma and high blood pressure.  In cases where prednisone use is prolonged, patients should be monitored with blood pressure checks, serum glucose levels and an eye exam.  Additionally, the patient may need to be placed on GI prophylaxis, PCP prophylaxis, and calcium and vitamin D supplementation and/or a bisphosphonate.  The patient verbalized understanding of the proper use and the possible adverse effects of prednisone.  All of the patient's questions and concerns were addressed.

## 2023-10-31 ENCOUNTER — APPOINTMENT (OUTPATIENT)
Dept: VASCULAR SURGERY | Facility: CLINIC | Age: 83
End: 2023-10-31
